# Patient Record
Sex: MALE | Race: WHITE | NOT HISPANIC OR LATINO | Employment: OTHER | URBAN - METROPOLITAN AREA
[De-identification: names, ages, dates, MRNs, and addresses within clinical notes are randomized per-mention and may not be internally consistent; named-entity substitution may affect disease eponyms.]

---

## 2017-01-05 ENCOUNTER — TELEPHONE (OUTPATIENT)
Dept: HEPATOLOGY | Facility: CLINIC | Age: 62
End: 2017-01-05

## 2017-01-05 NOTE — TELEPHONE ENCOUNTER
----- Message from Elyssa Hyatt MD sent at 1/3/2017  9:44 PM CST -----  The Labs are stable - please let patient know.

## 2017-01-25 ENCOUNTER — CLINICAL SUPPORT (OUTPATIENT)
Dept: SMOKING CESSATION | Facility: CLINIC | Age: 62
End: 2017-01-25
Payer: COMMERCIAL

## 2017-01-25 DIAGNOSIS — F17.210 MODERATE SMOKER (20 OR LESS PER DAY): Primary | ICD-10-CM

## 2017-01-25 PROCEDURE — 99404 PREV MED CNSL INDIV APPRX 60: CPT | Mod: S$GLB,,,

## 2017-01-25 RX ORDER — VARENICLINE TARTRATE 0.5 (11)-1
KIT ORAL
Qty: 1 PACKAGE | Refills: 0 | Status: SHIPPED | OUTPATIENT
Start: 2017-01-25 | End: 2017-02-26

## 2017-02-16 ENCOUNTER — CLINICAL SUPPORT (OUTPATIENT)
Dept: SMOKING CESSATION | Facility: CLINIC | Age: 62
End: 2017-02-16
Payer: COMMERCIAL

## 2017-02-16 DIAGNOSIS — F17.210 MODERATE SMOKER (20 OR LESS PER DAY): Primary | ICD-10-CM

## 2017-02-16 PROCEDURE — 90853 GROUP PSYCHOTHERAPY: CPT | Mod: S$GLB,,,

## 2017-02-16 NOTE — PROGRESS NOTES
Site: [facility]  Date:  2/16/2017  Clinical Status of Patient: Outpatient   Length of Service and Code: 90 minutes - 05604   Number in Attendance: 7  Group Activities/Focus of Group:  orientation, client introductions, completion of TCRS (Tobacco Cessation Rating Scale) learned addiction model, cues/triggers, personal reasons for quitting, medications, goals, quit date    Target symptoms:  withdrawal and medication side effects             The following were rated moderate (3) to severe (4) on TCRS:       Moderate 3: None      Severe 4:   None  Patient's Response to Intervention: Patient remains on tobacco cessation prescribed medication regimen of Chantix 1mg  twice a day without any negative side effects at this time. Patient is down to 7-8 cigarettes per day from 20 a day. Patient will return in 1 week for tobacco cessation group.  Progress Toward Goals and Other Mental Status Changes: The patient denies any abnormal behavioral or mental changes at this time.       Diagnosis: Z72.0  Plan: The patient will continue with group therapy sessions and medication regimen prescribed with management by physician or Cessation Clinic Provider. Patient will inform Smoking Clinic Cessation Counselor of symptoms as rated high on TCRS.    Return to Clinic: 1 week

## 2017-02-16 NOTE — Clinical Note
Patient remains on tobacco cessation prescribed medication regimen of Chantix 1mg  twice a day without any negative side effects at this time. Patient is down to 7-8 cigarettes per day from 20 a day. Patient will return in 1 week for tobacco cessation group

## 2017-02-23 ENCOUNTER — CLINICAL SUPPORT (OUTPATIENT)
Dept: SMOKING CESSATION | Facility: CLINIC | Age: 62
End: 2017-02-23
Payer: COMMERCIAL

## 2017-02-23 DIAGNOSIS — F17.210 SMOKES 1/2 PACK/DAY OR LESS: Primary | ICD-10-CM

## 2017-02-23 PROCEDURE — 90853 GROUP PSYCHOTHERAPY: CPT | Mod: S$GLB,,,

## 2017-02-23 NOTE — PROGRESS NOTES
Smoking Cessation Group Session #2    Site: [facility]  Date:  2/23/2017  Clinical Status of Patient: Outpatient   Length of Service and Code: 90 minutes - 88279   Number in Attendance: 6  Group Activities/Focus of Group: completion of TCRS (Tobacco Cessation Rating Scale) reviewed strategies, cues, and triggers. Introduced the negative impact of tobacco on health, the health advantages of discontinuing the use of tobacco, time line improved health changes after a quit, withdrawal issues to expect from nicotine and habit, and ways to achieve the goal of a quit.    Target symptoms:  withdrawal and medication side effects             The following were rated moderate (3) to severe (4) on TCRS:       Moderate 3: none     Severe 4:   none  Patient's Response to Intervention: 24ppm; 10 cig/day; The patient remains on the prescribed tobacco cessation medication regimen of 1 mg Chantix BID without any negative side effects at this time.     Progress Toward Goals and Other Mental Status Changes: The patient denies any abnormal behavioral or mental changes at this time.     Diagnosis: Z72.0  Plan: The patient will continue with group therapy sessions and medication monitoring by CTTS. Prescribed medication management will be by physician.   Return to Clinic: 1 week    Quit Date:    Planned Quit Date:

## 2017-02-23 NOTE — Clinical Note
24ppm; 10 cig/day; The patient remains on the prescribed tobacco cessation medication regimen of 1 mg Chantix BID without any negative side effects at this time.

## 2017-03-09 ENCOUNTER — CLINICAL SUPPORT (OUTPATIENT)
Dept: SMOKING CESSATION | Facility: CLINIC | Age: 62
End: 2017-03-09
Payer: COMMERCIAL

## 2017-03-09 DIAGNOSIS — F17.210 LIGHT CIGARETTE SMOKER (1-9 CIGS/DAY): Primary | ICD-10-CM

## 2017-03-09 PROCEDURE — 90853 GROUP PSYCHOTHERAPY: CPT | Mod: S$GLB,,,

## 2017-03-09 RX ORDER — IBUPROFEN 200 MG
1 TABLET ORAL DAILY
Qty: 14 PATCH | Refills: 0 | Status: SHIPPED | OUTPATIENT
Start: 2017-03-09 | End: 2017-06-20

## 2017-03-09 NOTE — PROGRESS NOTES
Smoking Cessation Group Session #4    Site: [facility]  Date:  3/9/2017  Clinical Status of Patient: Outpatient   Length of Service and Code: 90 minutes - 57418   Number in Attendance: 5  Group Activities/Focus of Group: completion of TCRS (Tobacco Cessation Rating Scale) reviewed strategies, habitual behavior, stress, and high risk situations. Introduced stress with addition interventions, SOLVE, relaxation with interventions, nutrition, exercise, weight gain, and the importance of rewarding oneself for accomplishments toward becoming tobacco free. Open discussion of all items with interventions.     Target symptoms:  withdrawal and medication side effects             The following were rated moderate (3) to severe (4) on TCRS:       Moderate 3: none     Severe 4:   none  Patient's Response to Intervention: 10ppm; 4-5 cig/day; pt reports stopped taking Chantix on 3/6/17 due to skin rash and diarrhea, pt will be 14mg nicotine patch daily as directed; desire to quit = 10, confidence = 10    Progress Toward Goals and Other Mental Status Changes: The patient denies any abnormal behavioral or mental changes at this time.     Diagnosis: Z72.0  Plan: The patient will continue with group therapy sessions and medication monitoring by CTTS. Prescribed medication management will be by physician.   Return to Clinic: 1 week    Quit Date:    Planned Quit Date:

## 2017-03-09 NOTE — Clinical Note
10ppm; 4-5 cig/day; pt reports stopped taking Chantix on 3/6/17 due to skin rash and diarrhea, pt will be 14mg nicotine patch daily as directed

## 2017-03-16 ENCOUNTER — CLINICAL SUPPORT (OUTPATIENT)
Dept: SMOKING CESSATION | Facility: CLINIC | Age: 62
End: 2017-03-16
Payer: COMMERCIAL

## 2017-03-16 DIAGNOSIS — F17.210 LIGHT CIGARETTE SMOKER (1-9 CIGS/DAY): Primary | ICD-10-CM

## 2017-03-16 PROCEDURE — 90853 GROUP PSYCHOTHERAPY: CPT | Mod: S$GLB,,,

## 2017-03-16 NOTE — PROGRESS NOTES
"Smoking Cessation Group Session #5    Site: [facility]  Date:  3/16/2017  Clinical Status of Patient: Outpatient   Length of Service and Code: 90 minutes - 90998   Number in Attendance: 4  Group Activities/Focus of Group: completion of TCRS (Tobacco Cessation Rating Scale) reviewed strategies, habitual behavior, high risks situations, understanding urges and cravings, stress and relaxation with open discussion and additional interventions, Introduced lapses, relapses, understanding them and analyzing the situation of a lapse, conflict issues that may be linked to a lapse.     Target symptoms:  withdrawal and medication side effects             The following were rated moderate (3) to severe (4) on TCRS:       Moderate 3: none     Severe 4:   none  Patient's Response to Intervention: 7ppm; 4-5 cig/day; Patient remains on prescribed tobacco cessation medication regimen of 14mg patch, without any negative side effects at this time, pt will attempt "dry run" on 3/19/17; desire to quit = 10, confidence = 10      Progress Toward Goals and Other Mental Status Changes: The patient denies any abnormal behavioral or mental changes at this time.     Diagnosis: Z72.0  Plan: The patient will continue with group therapy sessions and medication monitoring by CTTS. Prescribed medication management will be by physician.   Return to Clinic: 1 week    Quit Date:    Planned Quit Date: 3/19/17  "

## 2017-03-16 NOTE — Clinical Note
" 7ppm; 4-5 cig/day; Patient remains on prescribed tobacco cessation medication regimen of 14mg patch, without any negative side effects at this time, pt will attempt "dry run" on 3/19/17; desire to quit = 10, confidence = 10"

## 2017-03-23 ENCOUNTER — CLINICAL SUPPORT (OUTPATIENT)
Dept: SMOKING CESSATION | Facility: CLINIC | Age: 62
End: 2017-03-23
Payer: COMMERCIAL

## 2017-03-23 DIAGNOSIS — F17.210 LIGHT CIGARETTE SMOKER (1-9 CIGS/DAY): Primary | ICD-10-CM

## 2017-03-23 PROCEDURE — 90853 GROUP PSYCHOTHERAPY: CPT | Mod: S$GLB,,,

## 2017-03-23 NOTE — PROGRESS NOTES
Smoking Cessation Group Session #6    Site: [facility]  Date:  3/23/2017  Clinical Status of Patient: Outpatient   Length of Service and Code: 90 minutes - 92446   Number in Attendance: 5  Group Activities/Focus of Group:  completion of TCRS (Tobacco Cessation Rating Scale) reviewed strategies, cues, triggers, high risk situations, lapses, relapses, diet, exercise, stress, relaxation, sleep, habitual behavior, and life style changes.    Target symptoms:  withdrawal and medication side effects             The following were rated moderate (3) to severe (4) on TCRS:       Moderate 3: none     Severe 4:   none  Patient's Response to Intervention: 18ppm; 3-4 cig/day; pt will discontinue use of nicotine patch and resume Chantix for cessation treatment (.5mg one time daily for 4 days, then .5mg BID for 3 days, then 1mg BID for remainder of treatment)  Progress Toward Goals and Other Mental Status Changes: The patient denies any abnormal behavioral or mental changes at this time.     Diagnosis: Z72.0  Plan: The patient will continue with group therapy sessions and medication monitoring by CTTS. Prescribed medication management will be by physician.   Return to Clinic: 2 weeks    Quit Date:    Planned Quit Date: 3/26/17

## 2017-03-23 NOTE — Clinical Note
18ppm; 3-4 cig/day; pt will discontinue use of nicotine patch and resume Chantix for cessation treatment (.5mg one time daily for 4 days, then .5mg BID for 3 days, then 1mg BID for remainder of treatment)

## 2017-04-06 ENCOUNTER — CLINICAL SUPPORT (OUTPATIENT)
Dept: SMOKING CESSATION | Facility: CLINIC | Age: 62
End: 2017-04-06
Payer: COMMERCIAL

## 2017-04-06 DIAGNOSIS — F17.210 LIGHT CIGARETTE SMOKER (1-9 CIGS/DAY): Primary | ICD-10-CM

## 2017-04-06 PROCEDURE — 99401 PREV MED CNSL INDIV APPRX 15: CPT | Mod: S$GLB,,,

## 2017-04-06 RX ORDER — VARENICLINE TARTRATE 1 MG/1
1 TABLET, FILM COATED ORAL 2 TIMES DAILY
Qty: 60 TABLET | Refills: 0 | Status: SHIPPED | OUTPATIENT
Start: 2017-04-06 | End: 2017-05-07

## 2017-04-06 NOTE — PROGRESS NOTES
Individual Follow-Up Form    4/6/2017    Quit Date:     Clinical Status of Patient: Outpatient    Length of Service: 15 minutes    Continuing Medication: yes  Chantix    Other Medications:      Target Symptoms: Withdrawal and medication side effects. The following were  rated moderate (3) to severe (4) on TCRS:  · Moderate (3): none  · Severe (4): none    Comments: 19ppm; 3-4 cig/day; pt went on vacation for 5 days and only smoked 3 cigarettes during whole trip, has been back to 3-4 cig/day since returned home; The patient remains on the prescribed tobacco cessation medication regimen of 1 mg Chantix BID without any negative side effects at this time; The patient denies any abnormal behavioral or mental changes at this time; desire to quit = 10, confidence = 10        Diagnosis: F17.210    Next Visit: 2 weeks

## 2017-04-06 NOTE — Clinical Note
19ppm; 3-4 cig/day; pt went on vacation for 5 days and only smoked 3 cigarettes during whole trip, has been back to 3-4 cig/day since returned home; The patient remains on the prescribed tobacco cessation medication regimen of 1 mg Chantix BID without any negative side effects at this time; The patient denies any abnormal behavioral or mental changes at this time; desire to quit = 10, confidence = 10

## 2017-04-11 ENCOUNTER — HOSPITAL ENCOUNTER (OUTPATIENT)
Dept: RADIOLOGY | Facility: HOSPITAL | Age: 62
Discharge: HOME | End: 2017-04-11
Attending: INTERNAL MEDICINE
Payer: MEDICAID

## 2017-04-11 DIAGNOSIS — R18.8 OTHER ASCITES: ICD-10-CM

## 2017-04-11 PROCEDURE — 76705 ECHO EXAM OF ABDOMEN: CPT | Mod: 26,,, | Performed by: RADIOLOGY

## 2017-04-11 PROCEDURE — 76705 ECHO EXAM OF ABDOMEN: CPT | Mod: TC

## 2017-04-27 ENCOUNTER — CLINICAL SUPPORT (OUTPATIENT)
Dept: SMOKING CESSATION | Facility: CLINIC | Age: 62
End: 2017-04-27
Payer: COMMERCIAL

## 2017-04-27 DIAGNOSIS — F17.210 LIGHT CIGARETTE SMOKER (1-9 CIGS/DAY): Primary | ICD-10-CM

## 2017-04-27 PROCEDURE — 99407 BEHAV CHNG SMOKING > 10 MIN: CPT | Mod: S$GLB,,,

## 2017-04-27 RX ORDER — ASPIRIN/CALCIUM CARB/MAGNESIUM 325 MG
TABLET ORAL
Qty: 108 LOZENGE | Refills: 0 | Status: SHIPPED | OUTPATIENT
Start: 2017-04-27 | End: 2018-01-22

## 2017-04-27 NOTE — Clinical Note
7ppm; 3-4 cig/day; The patient remains on the prescribed tobacco cessation medication regimen of 1 mg Chantix BID, along with 4mg nicotine lozenges as needed for cravings,  without any negative side effects at this time; The patient denies any abnormal behavioral or mental changes at this time; desire to quit = 10, confidence = 10

## 2017-04-27 NOTE — PROGRESS NOTES
Individual Follow-Up Form    4/27/2017    Quit Date:     Clinical Status of Patient: Outpatient    Length of Service: 15 minutes    Continuing Medication: yes  Chantix or Nicotine Lozenges    Other Medications:      Target Symptoms: Withdrawal and medication side effects. The following were  rated moderate (3) to severe (4) on TCRS:  · Moderate (3): none  · Severe (4): none    Comments: 7ppm; 3-4 cig/day; The patient remains on the prescribed tobacco cessation medication regimen of 1 mg Chantix BID, along with 4mg nicotine lozenges as needed for cravings,  without any negative side effects at this time; The patient denies any abnormal behavioral or mental changes at this time; desire to quit = 10, confidence = 10           Diagnosis: F17.210    Next Visit: 2 weeks

## 2017-05-11 ENCOUNTER — CLINICAL SUPPORT (OUTPATIENT)
Dept: SMOKING CESSATION | Facility: CLINIC | Age: 62
End: 2017-05-11
Payer: COMMERCIAL

## 2017-05-11 DIAGNOSIS — F17.210 LIGHT CIGARETTE SMOKER (1-9 CIGS/DAY): Primary | ICD-10-CM

## 2017-05-11 PROCEDURE — 99407 BEHAV CHNG SMOKING > 10 MIN: CPT | Mod: S$GLB,,,

## 2017-05-11 NOTE — Clinical Note
2ppm; 1-2 cig/day; The patient remains on the prescribed tobacco cessation medication regimen of 1 mg Chantix BID, along with 4mg nicotine lozenge as needed,without any negative side effects at this time.; desire to quit = 10, confidence = 10

## 2017-06-06 ENCOUNTER — CLINICAL SUPPORT (OUTPATIENT)
Dept: SMOKING CESSATION | Facility: CLINIC | Age: 62
End: 2017-06-06
Payer: COMMERCIAL

## 2017-06-06 DIAGNOSIS — F17.200 NICOTINE DEPENDENCE: Primary | ICD-10-CM

## 2017-06-06 PROCEDURE — 99407 BEHAV CHNG SMOKING > 10 MIN: CPT | Mod: S$GLB,,,

## 2017-07-28 RX ORDER — PANTOPRAZOLE SODIUM 40 MG/1
TABLET, DELAYED RELEASE ORAL
Qty: 30 TABLET | Refills: 11 | Status: SHIPPED | OUTPATIENT
Start: 2017-07-28 | End: 2017-08-14 | Stop reason: SDUPTHER

## 2017-10-13 DIAGNOSIS — R18.8 OTHER ASCITES: ICD-10-CM

## 2017-10-15 RX ORDER — SPIRONOLACTONE 50 MG/1
TABLET, FILM COATED ORAL
Qty: 90 TABLET | Refills: 11 | Status: SHIPPED | OUTPATIENT
Start: 2017-10-15 | End: 2018-06-04 | Stop reason: ALTCHOICE

## 2018-01-10 ENCOUNTER — CLINICAL SUPPORT (OUTPATIENT)
Dept: SMOKING CESSATION | Facility: CLINIC | Age: 63
End: 2018-01-10
Payer: COMMERCIAL

## 2018-01-10 DIAGNOSIS — F17.200 NICOTINE DEPENDENCE: Primary | ICD-10-CM

## 2018-01-10 PROCEDURE — 99407 BEHAV CHNG SMOKING > 10 MIN: CPT | Mod: S$GLB,,,

## 2018-01-22 ENCOUNTER — CLINICAL SUPPORT (OUTPATIENT)
Dept: SMOKING CESSATION | Facility: CLINIC | Age: 63
End: 2018-01-22
Payer: COMMERCIAL

## 2018-01-22 DIAGNOSIS — F17.210 LIGHT CIGARETTE SMOKER (1-9 CIGS/DAY): Primary | ICD-10-CM

## 2018-01-22 PROCEDURE — 99404 PREV MED CNSL INDIV APPRX 60: CPT | Mod: S$GLB,,,

## 2018-01-22 RX ORDER — BUPROPION HYDROCHLORIDE 150 MG/1
TABLET, EXTENDED RELEASE ORAL
Qty: 60 TABLET | Refills: 0 | Status: SHIPPED | OUTPATIENT
Start: 2018-01-22 | End: 2018-02-20 | Stop reason: SDUPTHER

## 2018-01-23 ENCOUNTER — CLINICAL SUPPORT (OUTPATIENT)
Dept: SMOKING CESSATION | Facility: CLINIC | Age: 63
End: 2018-01-23
Payer: COMMERCIAL

## 2018-01-23 DIAGNOSIS — F17.210 SMOKES 1/2 PACK/DAY OR LESS: Primary | ICD-10-CM

## 2018-01-23 PROCEDURE — 90853 GROUP PSYCHOTHERAPY: CPT | Mod: S$GLB,,,

## 2018-01-23 NOTE — PROGRESS NOTES
Smoking Cessation Group Session #2    Site: [facility]  Date:  1/23/2018  Clinical Status of Patient: Outpatient   Length of Service and Code: 90 minutes - 65251   Number in Attendance: 7  Group Activities/Focus of Group:  completion of TCRS (Tobacco Cessation Rating Scale) reviewed strategies, cues, and triggers. Introduced the negative impact of tobacco on health, the health advantages of discontinuing the use of tobacco, time line improved health changes after a quit, withdrawal issues to expect from nicotine and habit, and ways to achieve the goal of a quit.    Target symptoms:  withdrawal and medication side effects             The following were rated moderate (3) to severe (4) on TCRS:       Moderate 3: desire/crave tobacco; reviewed with patient     Severe 4:   none  Patient's Response to Intervention: 9ppm; 10 cig/day; The patient remains on the prescribed tobacco cessation medication regimen of 150 mg bupropion BID without any negative side effects at this time; desire to quit = 10, confidence = 10         Progress Toward Goals and Other Mental Status Changes: The patient denies any abnormal behavioral or mental changes at this time.     Diagnosis: Z72.0  Plan: The patient will continue with group therapy sessions and medication monitoring by CTTS. Prescribed medication management will be by physician.   Return to Clinic: 1 week    Quit Date:    Planned Quit Date:

## 2018-01-23 NOTE — Clinical Note
9ppm; 10 cig/day; The patient remains on the prescribed tobacco cessation medication regimen of 150 mg bupropion BID without any negative side effects at this time; desire to quit = 10, confidence = 10

## 2018-01-30 ENCOUNTER — TELEPHONE (OUTPATIENT)
Dept: SMOKING CESSATION | Facility: CLINIC | Age: 63
End: 2018-01-30

## 2018-02-06 ENCOUNTER — CLINICAL SUPPORT (OUTPATIENT)
Dept: SMOKING CESSATION | Facility: CLINIC | Age: 63
End: 2018-02-06
Payer: COMMERCIAL

## 2018-02-06 DIAGNOSIS — F17.210 LIGHT CIGARETTE SMOKER (1-9 CIGS/DAY): Primary | ICD-10-CM

## 2018-02-06 PROCEDURE — 90853 GROUP PSYCHOTHERAPY: CPT | Mod: S$GLB,,,

## 2018-02-06 NOTE — PROGRESS NOTES
Smoking Cessation Group Session #4    Site: [facility]  Date:  2/6/2018  Clinical Status of Patient: Outpatient   Length of Service and Code: 90 minutes - 35805   Number in Attendance: 4  Group Activities/Focus of Group:  completion of TCRS (Tobacco Cessation Rating Scale) reviewed strategies, habitual behavior, stress, and high risk situations. Introduced stress with addition interventions, SOLVE, relaxation with interventions, nutrition, exercise, weight gain, and the importance of rewarding oneself for accomplishments toward becoming tobacco free. Open discussion of all items with interventions.     Target symptoms:  withdrawal and medication side effects             The following were rated moderate (3) to severe (4) on TCRS:       Moderate 3: increased appetite/hunger; reviewed with patient     Severe 4:   none  Patient's Response to Intervention: 7ppm; 2-3 cig/day; The patient remains on the prescribed tobacco cessation medication regimen of 150 mg bupropion BID without any negative side effects at this time; desire to quit = 10, confidence = 10      Progress Toward Goals and Other Mental Status Changes: The patient denies any abnormal behavioral or mental changes at this time.     Diagnosis: Z72.0  Plan: The patient will continue with group therapy sessions and medication monitoring by CTTS. Prescribed medication management will be by physician.   Return to Clinic: 2 weeks    Quit Date:    Planned Quit Date:

## 2018-02-06 NOTE — Clinical Note
7ppm; 2-3 cig/day; The patient remains on the prescribed tobacco cessation medication regimen of 150 mg bupropion BID without any negative side effects at this time; desire to quit = 10, confidence = 10

## 2018-02-20 ENCOUNTER — CLINICAL SUPPORT (OUTPATIENT)
Dept: SMOKING CESSATION | Facility: CLINIC | Age: 63
End: 2018-02-20
Payer: COMMERCIAL

## 2018-02-20 DIAGNOSIS — F17.210 LIGHT CIGARETTE SMOKER (1-9 CIGS/DAY): Primary | ICD-10-CM

## 2018-02-20 PROCEDURE — 90853 GROUP PSYCHOTHERAPY: CPT | Mod: S$GLB,,,

## 2018-02-20 RX ORDER — BUPROPION HYDROCHLORIDE 150 MG/1
TABLET, EXTENDED RELEASE ORAL
Qty: 60 TABLET | Refills: 0 | Status: SHIPPED | OUTPATIENT
Start: 2018-02-20 | End: 2018-03-15 | Stop reason: SDUPTHER

## 2018-02-20 NOTE — Clinical Note
12ppm; 4 cig/day; The patient remains on the prescribed tobacco cessation medication regimen of 150 mg bupropion BID without any negative side effects at this time; pt set goal of dry run on 2/21; desire to quit = 10, confidence = 10

## 2018-02-20 NOTE — PROGRESS NOTES
Smoking Cessation Group Session #5    Site: [facility]  Date:  2/20/2018  Clinical Status of Patient: Outpatient   Length of Service and Code: 90 minutes - 81733   Number in Attendance: 5  Group Activities/Focus of Group: completion of TCRS (Tobacco Cessation Rating Scale) reviewed strategies, habitual behavior, high risks situations, understanding urges and cravings, stress and relaxation with open discussion and additional interventions, Introduced lapses, relapses, understanding them and analyzing the situation of a lapse, conflict issues that may be linked to a lapse.     Target symptoms:  withdrawal and medication side effects             The following were rated moderate (3) to severe (4) on TCRS:       Moderate 3: restless; reviewed with patient     Severe 4:   Increased appetite/hunger; reviewed with patient  Patient's Response to Intervention: 12ppm; 4 cig/day; The patient remains on the prescribed tobacco cessation medication regimen of 150 mg bupropion BID without any negative side effects at this time; pt set goal of dry run on 2/21; desire to quit = 10, confidence = 10         Progress Toward Goals and Other Mental Status Changes: The patient denies any abnormal behavioral or mental changes at this time.     Diagnosis: Z72.0  Plan: The patient will continue with group therapy sessions and medication monitoring by CTTS. Prescribed medication management will be by physician.   Return to Clinic: 1 week    Quit Date:    Planned Quit Date:

## 2018-02-27 ENCOUNTER — CLINICAL SUPPORT (OUTPATIENT)
Dept: SMOKING CESSATION | Facility: CLINIC | Age: 63
End: 2018-02-27
Payer: COMMERCIAL

## 2018-02-27 DIAGNOSIS — F17.210 LIGHT CIGARETTE SMOKER (1-9 CIGS/DAY): Primary | ICD-10-CM

## 2018-02-27 PROCEDURE — 90853 GROUP PSYCHOTHERAPY: CPT | Mod: S$GLB,,,

## 2018-02-27 NOTE — PROGRESS NOTES
Smoking Cessation Group Session #6    Site: [facility]  Date:  2/27/2018  Clinical Status of Patient: Outpatient   Length of Service and Code: 90 minutes - 24705   Number in Attendance: 5  Group Activities/Focus of Group:  completion of TCRS (Tobacco Cessation Rating Scale) reviewed strategies, cues, triggers, high risk situations, lapses, relapses, diet, exercise, stress, relaxation, sleep, habitual behavior, and life style changes.    Target symptoms:  withdrawal and medication side effects             The following were rated moderate (3) to severe (4) on TCRS:       Moderate 3: none     Severe 4:   none  Patient's Response to Intervention: 7ppm; 3-4 cig/day; The patient remains on the prescribed tobacco cessation medication regimen of 150 mg bupropion BID without any negative side effects at this time; desire to quit = 10, confidence = 10        Progress Toward Goals and Other Mental Status Changes: The patient denies any abnormal behavioral or mental changes at this time.     Diagnosis: f17.210    Plan: The patient will continue with group therapy sessions and medication monitoring by CTTS. Prescribed medication management will be by physician.   Return to Clinic: 2 weeks    Quit Date:    Planned Quit Date:

## 2018-02-27 NOTE — Clinical Note
7ppm; 3-4 cig/day; The patient remains on the prescribed tobacco cessation medication regimen of 150 mg bupropion BID without any negative side effects at this time; desire to quit = 10, confidence = 10

## 2018-03-15 ENCOUNTER — CLINICAL SUPPORT (OUTPATIENT)
Dept: SMOKING CESSATION | Facility: CLINIC | Age: 63
End: 2018-03-15
Payer: COMMERCIAL

## 2018-03-15 DIAGNOSIS — F17.210 LIGHT CIGARETTE SMOKER (1-9 CIGS/DAY): Primary | ICD-10-CM

## 2018-03-15 PROCEDURE — 99402 PREV MED CNSL INDIV APPRX 30: CPT | Mod: S$GLB,,,

## 2018-03-15 RX ORDER — BUPROPION HYDROCHLORIDE 150 MG/1
150 TABLET, EXTENDED RELEASE ORAL 2 TIMES DAILY
Qty: 60 TABLET | Refills: 0 | Status: SHIPPED | OUTPATIENT
Start: 2018-03-15 | End: 2018-04-16 | Stop reason: SDUPTHER

## 2018-03-15 NOTE — PROGRESS NOTES
Individual Follow-Up Form    3/15/2018    Quit Date:     Clinical Status of Patient: Outpatient    Length of Service: 30 minutes    Continuing Medication: no    Other Medications:      Target Symptoms: Withdrawal and medication side effects. The following were  rated moderate (3) to severe (4) on TCRS:  · Moderate (3): none  · Severe (4): none    Comments: 4ppm; 3-4 cig/day; pt states he discontinued use of all medications for no particular reason, just to quit taking them; pt encouraged to restart medication or to speak with PCP regarding his decision; pt reported to clinic smelling of alcohol use    Diagnosis: F17.210    Next Visit: 2 weeks

## 2018-03-15 NOTE — Clinical Note
4ppm; 3-4 cig/day; pt states he discontinued use of all medications for no particular reason, just to quit taking them; pt encouraged to restart medication or to speak with PCP regarding his decision; pt reported to clinic smelling of alcohol use

## 2018-04-16 ENCOUNTER — CLINICAL SUPPORT (OUTPATIENT)
Dept: SMOKING CESSATION | Facility: CLINIC | Age: 63
End: 2018-04-16
Payer: COMMERCIAL

## 2018-04-16 DIAGNOSIS — F17.210 LIGHT CIGARETTE SMOKER (1-9 CIGS/DAY): Primary | ICD-10-CM

## 2018-04-16 PROCEDURE — 99402 PREV MED CNSL INDIV APPRX 30: CPT | Mod: S$GLB,,,

## 2018-04-16 RX ORDER — BUPROPION HYDROCHLORIDE 150 MG/1
150 TABLET, EXTENDED RELEASE ORAL 2 TIMES DAILY
Qty: 60 TABLET | Refills: 0 | Status: SHIPPED | OUTPATIENT
Start: 2018-04-16 | End: 2018-06-04 | Stop reason: SDUPTHER

## 2018-04-16 NOTE — PROGRESS NOTES
Individual Follow-Up Form    4/16/2018    Quit Date:     Clinical Status of Patient: Outpatient    Length of Service: 30 minutes    Continuing Medication: yes  Wellbutrin    Other Medications:      Target Symptoms: Withdrawal and medication side effects. The following were  rated moderate (3) to severe (4) on TCRS:  · Moderate (3): none  · Severe (4): none    Comments: 15ppm; 10 cig/day; pt has not been compliant with taking medication and states restarted 150mg bupropion BID just this morning; pt will remain on 150mg bupropion BID as directed without negative side effects at this time; pt will follow up in clinic in 3 weeks to discuss medication use and forward progress in quit attempt     Diagnosis: F17.210    Next Visit: 5/7/18

## 2018-04-16 NOTE — Clinical Note
15ppm; 10 cig/day; pt has not been compliant with taking medication and states restarted 150mg bupropion BID just this morning; pt will remain on 150mg bupropion BID as directed without negative side effects at this time; pt will follow up in clinic in 3 weeks to discuss medication use and forward progress in quit attempt

## 2018-05-03 ENCOUNTER — CLINICAL SUPPORT (OUTPATIENT)
Dept: SMOKING CESSATION | Facility: CLINIC | Age: 63
End: 2018-05-03
Payer: COMMERCIAL

## 2018-05-03 DIAGNOSIS — F17.200 NICOTINE DEPENDENCE: Primary | ICD-10-CM

## 2018-05-03 PROCEDURE — 99407 BEHAV CHNG SMOKING > 10 MIN: CPT | Mod: S$GLB,,, | Performed by: INTERNAL MEDICINE

## 2018-05-03 NOTE — PROGRESS NOTES
Spoke with patient today in regard to smoking cessation progress, he states 1 pack of cigarettes last him a week now and everything is going well. Patient states he has no problems with medication at this time. Patient has appointment scheduled and will follow up with CTTS. Will complete smart form for 3 and 6 month follow up.

## 2018-05-07 ENCOUNTER — CLINICAL SUPPORT (OUTPATIENT)
Dept: SMOKING CESSATION | Facility: CLINIC | Age: 63
End: 2018-05-07
Payer: COMMERCIAL

## 2018-05-07 DIAGNOSIS — F17.210 LIGHT CIGARETTE SMOKER (1-9 CIGS/DAY): Primary | ICD-10-CM

## 2018-05-07 PROCEDURE — 99402 PREV MED CNSL INDIV APPRX 30: CPT | Mod: S$GLB,,,

## 2018-05-07 NOTE — Clinical Note
2-3 cig/day; pt reports being more compliant with taking medications, and has properly taken 150mg bupropion BID for the last week; pt is noticing progress in quit attempt and is looking forward to not buying anymore packs

## 2018-05-07 NOTE — PROGRESS NOTES
Individual Follow-Up Form    5/7/2018    Quit Date:     Clinical Status of Patient: Outpatient    Length of Service: 30 minutes    Continuing Medication: yes  Wellbutrin    Other Medications:      Target Symptoms: Withdrawal and medication side effects. The following were  rated moderate (3) to severe (4) on TCRS:  · Moderate (3): none  · Severe (4): none    Comments: 2-3 cig/day; pt reports being more compliant with taking medications, and has properly taken 150mg bupropion BID for the last week; pt is noticing progress in quit attempt and is looking forward to not buying anymore packs    Diagnosis: F17.210    Next Visit: 2 weeks

## 2018-05-30 ENCOUNTER — TELEPHONE (OUTPATIENT)
Dept: SMOKING CESSATION | Facility: CLINIC | Age: 63
End: 2018-05-30

## 2018-06-04 PROBLEM — E78.2 MIXED HYPERLIPIDEMIA: Status: ACTIVE | Noted: 2018-06-04

## 2018-06-07 ENCOUNTER — CLINICAL SUPPORT (OUTPATIENT)
Dept: SMOKING CESSATION | Facility: CLINIC | Age: 63
End: 2018-06-07
Payer: COMMERCIAL

## 2018-06-07 DIAGNOSIS — F17.210 LIGHT CIGARETTE SMOKER (1-9 CIGS/DAY): Primary | ICD-10-CM

## 2018-06-07 PROCEDURE — 99402 PREV MED CNSL INDIV APPRX 30: CPT | Mod: S$GLB,,,

## 2018-06-07 RX ORDER — NICOTINE 7MG/24HR
1 PATCH, TRANSDERMAL 24 HOURS TRANSDERMAL DAILY
Qty: 14 PATCH | Refills: 0 | Status: SHIPPED | OUTPATIENT
Start: 2018-06-07 | End: 2018-06-21 | Stop reason: SDUPTHER

## 2018-06-07 NOTE — Clinical Note
3-10 cig/day; pt continues to properly use cessation medication and remains on the prescribed tobacco cessation medication regimen of 150 mg bupropion BID, adding 7mg nicotine patch daily as directed, without any negative side effects at this time; pt is focused on resuming forward progress in quit attempt

## 2018-06-07 NOTE — PROGRESS NOTES
Individual Follow-Up Form    6/7/2018    Quit Date:     Clinical Status of Patient: Outpatient    Length of Service: 30 minutes    Continuing Medication: yes  Wellbutrin or Patches    Other Medications:      Target Symptoms: Withdrawal and medication side effects. The following were  rated moderate (3) to severe (4) on TCRS:  · Moderate (3): none  · Severe (4): none    Comments: 3-10 cig/day; pt continues to properly use cessation medication and remains on the prescribed tobacco cessation medication regimen of 150 mg bupropion BID, adding 7mg nicotine patch daily as directed, without any negative side effects at this time; pt is focused on resuming forward progress in quit attempt        Diagnosis: F17.210    Next Visit: 2 weeks

## 2018-06-21 ENCOUNTER — CLINICAL SUPPORT (OUTPATIENT)
Dept: SMOKING CESSATION | Facility: CLINIC | Age: 63
End: 2018-06-21
Payer: COMMERCIAL

## 2018-06-21 DIAGNOSIS — F17.210 LIGHT CIGARETTE SMOKER (1-9 CIGS/DAY): Primary | ICD-10-CM

## 2018-06-21 PROCEDURE — 99402 PREV MED CNSL INDIV APPRX 30: CPT | Mod: S$GLB,,,

## 2018-06-21 RX ORDER — NICOTINE 7MG/24HR
1 PATCH, TRANSDERMAL 24 HOURS TRANSDERMAL DAILY
Qty: 14 PATCH | Refills: 0 | Status: SHIPPED | OUTPATIENT
Start: 2018-06-21 | End: 2019-03-18 | Stop reason: ALTCHOICE

## 2018-06-21 NOTE — Clinical Note
2ppm; 2-7 cig/day; The patient remains on the prescribed tobacco cessation medication regimen of 150 mg bupropion BID, along with 7mg nicotine patch daily as directed, without any negative side effects at this time; pt has set goal of 2-4 cig/day

## 2018-06-21 NOTE — PROGRESS NOTES
Individual Follow-Up Form    6/21/2018    Quit Date:     Clinical Status of Patient: Outpatient    Length of Service: 30 minutes    Continuing Medication: yes  Wellbutrin or Patches    Other Medications:      Target Symptoms: Withdrawal and medication side effects. The following were  rated moderate (3) to severe (4) on TCRS:  · Moderate (3): none  · Severe (4): none    Comments: 2ppm; 2-7 cig/day; The patient remains on the prescribed tobacco cessation medication regimen of 150 mg bupropion BID, along with 7mg nicotine patch daily as directed, without any negative side effects at this time; pt has set goal of 2-4 cig/day        Diagnosis: F17.210    Next Visit: 2 weeks

## 2018-07-20 ENCOUNTER — TELEPHONE (OUTPATIENT)
Dept: SMOKING CESSATION | Facility: CLINIC | Age: 63
End: 2018-07-20

## 2018-08-02 ENCOUNTER — CLINICAL SUPPORT (OUTPATIENT)
Dept: SMOKING CESSATION | Facility: CLINIC | Age: 63
End: 2018-08-02
Payer: COMMERCIAL

## 2018-08-02 DIAGNOSIS — F17.200 NICOTINE DEPENDENCE: Primary | ICD-10-CM

## 2018-08-02 PROCEDURE — 99407 BEHAV CHNG SMOKING > 10 MIN: CPT | Mod: S$GLB,,, | Performed by: INTERNAL MEDICINE

## 2018-08-02 NOTE — PROGRESS NOTES
Spoke with patient today in regard to smoking cessation progress for 6 month follow up, he states tobacco free for 3 weeks. Commended patient on the accomplishment. Informed patient of benefit period, follow up for 1 year, and contact information if any further help or support is needed. Will complete smart form for 6 month follow up on Quit attempt #2.

## 2019-01-24 ENCOUNTER — CLINICAL SUPPORT (OUTPATIENT)
Dept: SMOKING CESSATION | Facility: CLINIC | Age: 64
End: 2019-01-24
Payer: COMMERCIAL

## 2019-01-24 DIAGNOSIS — F17.200 NICOTINE DEPENDENCE: Primary | ICD-10-CM

## 2019-01-24 PROCEDURE — 99407 PR TOBACCO USE CESSATION INTENSIVE >10 MINUTES: ICD-10-PCS | Mod: S$GLB,,,

## 2019-01-24 PROCEDURE — 99407 BEHAV CHNG SMOKING > 10 MIN: CPT | Mod: S$GLB,,,

## 2019-01-24 NOTE — PROGRESS NOTES
Spoke with patient today in regard to smoking cessation progress for 12 month follow up, he is currently tobacco free. Congratulated patient on the quit episode. Informed patient of benefit period, and contact information if any further help or support is needed.  Completed / resolved smart form for 12 month follow up on Quit 2 .

## 2019-03-20 ENCOUNTER — DOCUMENTATION ONLY (OUTPATIENT)
Dept: TRANSPLANT | Facility: CLINIC | Age: 64
End: 2019-03-20

## 2019-03-20 NOTE — NURSING
Pt records reviewed.   Pt will be referred to Hepatology.    Initial referral received  from the workque.   Referring Provider/diagnosis      GRACIA CORADO (Internal)   PRAVIN AGGARWAL MD   Alcoholic cirrhosis of liver with ascites      Referral letter sent to patient.

## 2019-03-20 NOTE — LETTER
March 20, 2019    Dexter Robertson  [address]  [address]  [facility] [address]      Dear Dexter Robertson:    Your doctor has referred you to the [facility]. We are sending this letter to remind you to make an appointment with us to complete the referral process.     Please call us at [phone] to schedule an appointment. We look forward to seeing you soon.     If you received a call and have been scheduled, please disregard this letter.       Sincerely,        [facility]   [address] [place] [id number]  [phone]

## 2019-03-24 ENCOUNTER — TELEPHONE (OUTPATIENT)
Dept: HEPATOLOGY | Facility: CLINIC | Age: 64
End: 2019-03-24

## 2019-03-24 DIAGNOSIS — K74.60 HEPATIC CIRRHOSIS, UNSPECIFIED HEPATIC CIRRHOSIS TYPE, UNSPECIFIED WHETHER ASCITES PRESENT: Primary | ICD-10-CM

## 2019-03-25 ENCOUNTER — TELEPHONE (OUTPATIENT)
Dept: HEPATOLOGY | Facility: CLINIC | Age: 64
End: 2019-03-25

## 2019-03-25 NOTE — TELEPHONE ENCOUNTER
----- Message from Gracia Corado MD sent at 3/24/2019  9:06 AM CDT -----  Orders in  ----- Message -----  From: Dariana Haley MA  Sent: 3/22/2019   4:39 PM  To: Gracia Corado MD        ----- Message -----  From: Jeannette Howard RN  Sent: 3/22/2019   4:04 PM  To: Olena Bergeron Staff        ----- Message -----  From: Jacquie St Anders  Sent: 3/21/2019  12:03 PM  To: Jeannette Howard, RN    Hi, since it's still under 3 yrs, the f/u should go to her MA, so that Dr. Corado can add any necessary orders to the visit.  ----- Message -----  From: Jeannette Howard RN  Sent: 3/20/2019  10:47 AM  To: Hepatology Scheduling    Pt records reviewed.   Pt will be referred to Hepatology.    Initial referral received  from the workque.   Referring Provider/diagnosis      GRACIA CORADO (Internal)  PRAVIN AGGARWAL MD  Alcoholic cirrhosis of liver with ascites     Referral letter sent to patient.

## 2019-03-29 ENCOUNTER — HOSPITAL ENCOUNTER (OUTPATIENT)
Dept: RADIOLOGY | Facility: OTHER | Age: 64
Discharge: HOME | End: 2019-03-29
Attending: INTERNAL MEDICINE
Payer: MEDICARE

## 2019-03-29 DIAGNOSIS — K74.60 HEPATIC CIRRHOSIS, UNSPECIFIED HEPATIC CIRRHOSIS TYPE, UNSPECIFIED WHETHER ASCITES PRESENT: ICD-10-CM

## 2019-03-29 PROCEDURE — 76700 US EXAM ABDOM COMPLETE: CPT | Mod: 26,,, | Performed by: RADIOLOGY

## 2019-03-29 PROCEDURE — 76700 US EXAM ABDOM COMPLETE: CPT | Mod: TC

## 2019-03-29 PROCEDURE — 76700 US ABDOMEN COMPLETE: ICD-10-PCS | Mod: 26,,, | Performed by: RADIOLOGY

## 2019-04-09 ENCOUNTER — OFFICE VISIT (OUTPATIENT)
Dept: HEPATOLOGY | Facility: CLINIC | Age: 64
End: 2019-04-09
Payer: MEDICARE

## 2019-04-09 VITALS
OXYGEN SATURATION: 99 % | SYSTOLIC BLOOD PRESSURE: 90 MMHG | BODY MASS INDEX: 27.3 KG/M2 | HEART RATE: 98 BPM | RESPIRATION RATE: 17 BRPM | WEIGHT: 190.69 LBS | DIASTOLIC BLOOD PRESSURE: 60 MMHG | TEMPERATURE: 98 F | HEIGHT: 70 IN

## 2019-04-09 DIAGNOSIS — K40.90 INGUINAL HERNIA WITHOUT OBSTRUCTION OR GANGRENE, RECURRENCE NOT SPECIFIED, UNSPECIFIED LATERALITY: ICD-10-CM

## 2019-04-09 DIAGNOSIS — K70.31 ALCOHOLIC CIRRHOSIS OF LIVER WITH ASCITES: Primary | ICD-10-CM

## 2019-04-09 PROCEDURE — 99999 PR PBB SHADOW E&M-EST. PATIENT-LVL V: CPT | Mod: PBBFAC,,, | Performed by: INTERNAL MEDICINE

## 2019-04-09 PROCEDURE — 99214 PR OFFICE/OUTPT VISIT, EST, LEVL IV, 30-39 MIN: ICD-10-PCS | Mod: S$GLB,,, | Performed by: INTERNAL MEDICINE

## 2019-04-09 PROCEDURE — 99999 PR PBB SHADOW E&M-EST. PATIENT-LVL V: ICD-10-PCS | Mod: PBBFAC,,, | Performed by: INTERNAL MEDICINE

## 2019-04-09 PROCEDURE — 3008F PR BODY MASS INDEX (BMI) DOCUMENTED: ICD-10-PCS | Mod: CPTII,S$GLB,, | Performed by: INTERNAL MEDICINE

## 2019-04-09 PROCEDURE — 3008F BODY MASS INDEX DOCD: CPT | Mod: CPTII,S$GLB,, | Performed by: INTERNAL MEDICINE

## 2019-04-09 PROCEDURE — 99214 OFFICE O/P EST MOD 30 MIN: CPT | Mod: S$GLB,,, | Performed by: INTERNAL MEDICINE

## 2019-04-09 NOTE — PROGRESS NOTES
HEPATOLOGY FOLLOW UP    Dexter Robertson is here for follow up of Cirrhosis    HPI  Since Dexter Robertson's last visit he continues to have issues with a hernia that has recurred. His ascites has resolved on diuretics. He states he isnot drinking any alcohol. However, his peth test is positive (77). He has no uncontrolled HE.    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 TIPS was not done due to a low gradient. Sx to repair the fistula has therefore been deferred.    Outpatient Encounter Medications as of 4/9/2019   Medication Sig Dispense Refill    buPROPion (WELLBUTRIN SR) 150 MG TBSR 12 hr tablet Take 1 tablet (150 mg total) by mouth 2 (two) times daily. 180 tablet 3    citalopram (CELEXA) 20 MG tablet Take 1 tablet (20 mg total) by mouth once daily. 90 tablet 3    furosemide (LASIX) 40 MG tablet Take 1.5 tablets (60 mg total) by mouth once daily. 45 tablet 2    lovastatin (MEVACOR) 20 MG tablet Take 1 tablet (20 mg total) by mouth every evening. 90 tablet 3    nystatin (MYCOSTATIN) cream Apply topically 3 (three) times daily. 60 g 0    pantoprazole (PROTONIX) 40 MG tablet Take 1 tablet (40 mg total) by mouth once daily. 90 tablet 3    spironolactone (ALDACTONE) 100 MG tablet Take 1.5 tablets (150 mg total) by mouth once daily. 45 tablet 2    omega-3 fatty acids-vitamin E (FISH OIL) 1,000 mg Cap Take 1 capsule by mouth 2 (two) times daily.  0     No facility-administered encounter medications on file as of 4/9/2019.      Review of patient's allergies indicates:   Allergen Reactions    Ativan [lorazepam] Other (See Comments)     confusion    Iodine and iodide containing products Edema    Pcn [penicillins] Edema    Bee sting [allergen ext-venom-honey bee]      Past Medical History:   Diagnosis Date    Bleeding ulcer     Depression     Diverticulitis     Fistula     High cholesterol     Smokes cigarettes        Review of Systems   Constitutional: Negative.    HENT: Negative.    Eyes:  Negative.    Respiratory: Negative.    Cardiovascular: Negative.    Gastrointestinal: Negative.    Genitourinary: Negative.    Musculoskeletal: Negative.    Skin: Negative.    Neurological: Negative.    Psychiatric/Behavioral: Negative.      Vitals:    04/09/19 1350   BP: 90/60   Pulse: 98   Resp: 17   Temp: 97.9 °F (36.6 °C)       Physical Exam   Constitutional: He is oriented to person, place, and time. He appears well-developed and well-nourished.   HENT:   Head: Normocephalic and atraumatic.   Eyes: Pupils are equal, round, and reactive to light. Conjunctivae and EOM are normal. No scleral icterus.   Neck: Normal range of motion. Neck supple. No thyromegaly present.   Cardiovascular: Normal rate, regular rhythm and normal heart sounds.   Pulmonary/Chest: Effort normal and breath sounds normal. He has no rales.   Abdominal: Soft. Bowel sounds are normal. He exhibits no distension and no mass. There is no tenderness.   Musculoskeletal: Normal range of motion. He exhibits no edema.   Neurological: He is alert and oriented to person, place, and time.   Skin: Skin is warm and dry. No rash noted.   Psychiatric: He has a normal mood and affect.   Vitals reviewed.      Lab Results   Component Value Date    GLU 96 01/21/2019    BUN 23 01/21/2019    CREATININE 1.2 01/21/2019    CALCIUM 9.4 01/21/2019     01/21/2019    K 4.3 01/21/2019     01/21/2019    PROT 7.8 06/04/2018    CO2 26 01/21/2019    ANIONGAP 10 01/21/2019    WBC 8.40 01/03/2017    RBC 5.65 01/03/2017    HGB 16.7 01/03/2017    HCT 51.5 01/03/2017    MCV 91 01/03/2017    MCH 29.5 01/03/2017    MCHC 32.3 01/03/2017     Lab Results   Component Value Date    RDW 13.9 01/03/2017     (H) 01/03/2017    MPV 8.4 (L) 01/03/2017    GRAN 5.3 01/03/2017    GRAN 63.1 01/03/2017    LYMPH 1.6 01/03/2017    LYMPH 18.8 01/03/2017    MONO 0.9 01/03/2017    MONO 10.6 01/03/2017    EOSINOPHIL 6.9 01/03/2017    BASOPHIL 0.6 01/03/2017    EOS 0.6 (H) 01/03/2017     BASO 0.10 01/03/2017    CHOL 254 (H) 06/04/2018    TRIG 372 (H) 06/04/2018    HDL 45 06/04/2018    CHOLHDL 17.7 (L) 06/04/2018    TOTALCHOLEST 5.6 (H) 06/04/2018    ALBUMIN 3.6 06/04/2018    AST 29 06/04/2018    ALT 27 06/04/2018    ALKPHOS 191 (H) 06/04/2018    MG 1.4 (L) 06/20/2016    LABPROT 11.0 06/04/2018    INR 1.0 06/04/2018    PSA 1.2 10/18/2016     MELD-Na score: 10 at 4/9/2019  2:47 PM  MELD score: 10 at 4/9/2019  2:47 PM  Calculated from:  Serum Creatinine: 1.37 mg/dL at 4/9/2019  2:47 PM  Serum Sodium: 139 mmol/L (Rounded to 137 mmol/L) at 4/9/2019  2:47 PM  Total Bilirubin: 0.4 mg/dL (Rounded to 1 mg/dL) at 4/9/2019  2:47 PM  INR(ratio): 1.0 at 4/9/2019  2:47 PM  Age: 63 years    Assessment and Plan:    Dexter Robertson is a 63 y.o. male with a colovesicula fistula and decompensated alcohol-induced Cirrhosis  Current recommendations:   1. Decompensated alcohol-induced cirrhosis: low (meld 10). Medically early for liver tx eval. Monitor meld labs every 12 weeks and screen for hcc every 6 months. Note recurrent alcohol use- will not be a liver transplant candidate sheould he need a new liver, due to ongoing drinking.  2. Ascites, improved: decrease lasix to 40 mg daily from 60 mg daily and aldactone should be lowered to 100 mg from 150 mg.  3. Hernia: pt would like to see a surgeon. Will refer to general surgery.

## 2019-04-24 ENCOUNTER — OFFICE VISIT (OUTPATIENT)
Dept: SURGERY | Facility: CLINIC | Age: 64
End: 2019-04-24
Payer: MEDICARE

## 2019-04-24 VITALS
DIASTOLIC BLOOD PRESSURE: 78 MMHG | SYSTOLIC BLOOD PRESSURE: 128 MMHG | HEART RATE: 80 BPM | BODY MASS INDEX: 27.52 KG/M2 | TEMPERATURE: 98 F | HEIGHT: 70 IN | WEIGHT: 192.25 LBS

## 2019-04-24 DIAGNOSIS — N32.1 COLOVESICAL FISTULA: Primary | ICD-10-CM

## 2019-04-24 PROCEDURE — 99999 PR PBB SHADOW E&M-EST. PATIENT-LVL III: ICD-10-PCS | Mod: PBBFAC,,, | Performed by: SURGERY

## 2019-04-24 PROCEDURE — 3008F PR BODY MASS INDEX (BMI) DOCUMENTED: ICD-10-PCS | Mod: CPTII,S$GLB,, | Performed by: SURGERY

## 2019-04-24 PROCEDURE — 99203 OFFICE O/P NEW LOW 30 MIN: CPT | Mod: S$GLB,,, | Performed by: SURGERY

## 2019-04-24 PROCEDURE — 99999 PR PBB SHADOW E&M-EST. PATIENT-LVL III: CPT | Mod: PBBFAC,,, | Performed by: SURGERY

## 2019-04-24 PROCEDURE — 3008F BODY MASS INDEX DOCD: CPT | Mod: CPTII,S$GLB,, | Performed by: SURGERY

## 2019-04-24 PROCEDURE — 99203 PR OFFICE/OUTPT VISIT, NEW, LEVL III, 30-44 MIN: ICD-10-PCS | Mod: S$GLB,,, | Performed by: SURGERY

## 2019-04-24 NOTE — PROGRESS NOTES
History & Physical    SUBJECTIVE:     History of Present Illness:  Patient is a 63 y.o. 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 Patient states he had a URI 6 weeks ago and was coughing incessantly at which time he noticed a large bulge to his right groin associated with pain with activity and intermittent constipation. He also has a history of umbilical hernia repair 2 years ago and notes it feels as though it has recurred. It is sometimes painful but not as bad as his right groin. Finally, he has a left-sided groin bulge which doesn't bother him too much. Has a history of large volume ascites which has improved significantly with medical management. Denies fevers, chills, nausea, vomiting, or any other complaints at this time.     Chief Complaint   Patient presents with    Consult       Review of patient's allergies indicates:   Allergen Reactions    Ativan [lorazepam] Other (See Comments)     confusion    Iodine and iodide containing products Edema    Pcn [penicillins] Edema    Bee sting [allergen ext-venom-honey bee]        Current Outpatient Medications   Medication Sig Dispense Refill    buPROPion (WELLBUTRIN SR) 150 MG TBSR 12 hr tablet Take 1 tablet (150 mg total) by mouth 2 (two) times daily. 180 tablet 3    citalopram (CELEXA) 20 MG tablet Take 1 tablet (20 mg total) by mouth once daily. 90 tablet 3    furosemide (LASIX) 40 MG tablet Take 1.5 tablets (60 mg total) by mouth once daily. 45 tablet 2    nystatin (MYCOSTATIN) cream Apply topically 3 (three) times daily. 60 g 0    pantoprazole (PROTONIX) 40 MG tablet Take 1 tablet (40 mg total) by mouth once daily. 90 tablet 3    spironolactone (ALDACTONE) 100 MG tablet Take 1.5 tablets (150 mg total) by mouth once daily. 45 tablet 2    omega-3 fatty acids-vitamin E (FISH OIL) 1,000 mg Cap Take 1 capsule by mouth 2 (two)  "times daily.  0     No current facility-administered medications for this visit.        Past Medical History:   Diagnosis Date    Bleeding ulcer     Depression     Diverticulitis     Fistula     High cholesterol     Smokes cigarettes      Past Surgical History:   Procedure Laterality Date    HERNIA REPAIR      TIPS N/A 8/9/2016    [facility]    TONSILLECTOMY       Family History   Problem Relation Age of Onset    Diabetes Mother     Heart disease Mother     Heart disease Father      Social History     Tobacco Use    Smoking status: Current Every Day Smoker     Packs/day: 0.50     Years: 43.00     Pack years: 21.50     Types: Cigarettes    Smokeless tobacco: Never Used   Substance Use Topics    Alcohol use: No    Drug use: No        Review of Systems:  Review of Systems   Constitutional: Negative for chills and fever.   HENT: Negative for sore throat.    Eyes: Negative for redness.   Respiratory: Negative for shortness of breath.    Cardiovascular: Negative for chest pain.   Gastrointestinal: Negative for abdominal pain.   Endocrine: Negative for polyuria.   Genitourinary: Negative for dysuria and hematuria.        Pneumouria.    Musculoskeletal: Negative for back pain.        Right groin bulge and pain, umbilical bulge and pain, left groin bulge.    Skin: Negative for rash.   Neurological: Negative for headaches.   Psychiatric/Behavioral: Negative for agitation.       OBJECTIVE:     Vital Signs (Most Recent)  Temp: 97.7 °F (36.5 °C) (04/24/19 0924)  Pulse: 80 (04/24/19 0924)  BP: 128/78 (04/24/19 0924)  5' 10" (1.778 m)  87.2 kg (192 lb 3.9 oz)     Physical Exam:  Physical Exam   Constitutional: He is oriented to person, place, and time. He appears well-developed and well-nourished. No distress.   HENT:   Head: Normocephalic and atraumatic.   Eyes: Conjunctivae are normal.   Neck: Normal range of motion.   Cardiovascular: Normal rate.   Pulmonary/Chest: Effort normal. No " respiratory distress.   Abdominal: Soft. There is tenderness (at hernias).   Large right-sided inguinal hernia repair that is mostly reducible. Tender to deep palpation. Left-sided inguinal hernia that is small and reducible. Small umbilical hernia that is tender with reduction. Small umbilical hernia scar. Well healed.    Musculoskeletal: He exhibits edema (bilateral lower extremity).   Neurological: He is alert and oriented to person, place, and time.   Skin: Skin is warm and dry.   Psychiatric: He has a normal mood and affect. His behavior is normal.   Nursing note and vitals reviewed.      Laboratory  Reviewed INR normal, PETH 77    Diagnostic Results:  US shows no ascites.   No new CT scans    ASSESSMENT/PLAN:     63-year-old male with decompensated liver disease with MELD of 10 and a colovesicular fistula desiring repair of his bilateral inguinal and umbilical hernias. Right inguinal hernia is fairly large and tender with reduction and is probably the highest risk of incarceration and strangulation compared to other hernias. He seems fairly well optimized from a liver standpoint with a normal INR and MELD of 10 with no ascites on last US. However, his larger and more pressing issue is the fact he has a colovesicular fistula. This would need to be repaired before any of his hernias are repaired as he is likely intermittently bacteremic and repairing the hernia in an infected field would have a poor outcome, especially with mesh. Further, a colovesicular fistula repair could possibly require an ostomy and this would complicated the picture of his hernia repair with mesh.     PLAN:  -CT abdomen/pelvis with rectal contrast to better characterize the colovesicular fistula  -Referral to [facility] Dr. Madsen for repair of colovesicular fistula.   -Could possibly repair the the right inguinal hernia at time of colovesicular fistula repair without mesh to temporize his risk of incarceration and strangulation.   -Will  coordinate with SHANI Kitchen, PGY1  General Surgery  [phone]

## 2019-04-24 NOTE — LETTER
April 29, 2019      Elyssa Hyatt MD  [address] [facility] [address]           [facility] General Surgery  [address]  [facility] [address]  Phone: [phone]          Patient: Dexter Robertson   MR Number: [id number]   YOB: 1955   Date of Visit: 4/24/2019       Dear Dr. Elyssa Hyatt:    Thank you for referring Dexter Robertson to me for evaluation. Attached you will find relevant portions of my assessment and plan of care.    If you have questions, please do not hesitate to call me. I look forward to following Dexter Robertson along with you.    Sincerely,    Humberto Frias MD    Enclosure  CC:  No Recipients    If you would like to receive this communication electronically, please contact [email or web address] or [phone] to request more information on [employer] Link access.    For providers and/or their staff who would like to refer a patient to [facility], please contact us through our one-stop-shop provider referral line, [facility], at [phone].    If you feel you have received this communication in error or would no longer like to receive these types of communications, please e-mail [email or web address]

## 2019-05-16 ENCOUNTER — OFFICE VISIT (OUTPATIENT)
Dept: SURGERY | Facility: CLINIC | Age: 64
End: 2019-05-16
Payer: MEDICARE

## 2019-05-16 VITALS
HEIGHT: 70 IN | WEIGHT: 194.88 LBS | BODY MASS INDEX: 27.9 KG/M2 | DIASTOLIC BLOOD PRESSURE: 82 MMHG | HEART RATE: 91 BPM | SYSTOLIC BLOOD PRESSURE: 119 MMHG

## 2019-05-16 DIAGNOSIS — K63.2 COLONIC FISTULA: Primary | ICD-10-CM

## 2019-05-16 PROCEDURE — 3008F PR BODY MASS INDEX (BMI) DOCUMENTED: ICD-10-PCS | Mod: CPTII,S$GLB,, | Performed by: COLON & RECTAL SURGERY

## 2019-05-16 PROCEDURE — 3008F BODY MASS INDEX DOCD: CPT | Mod: CPTII,S$GLB,, | Performed by: COLON & RECTAL SURGERY

## 2019-05-16 PROCEDURE — 99214 PR OFFICE/OUTPT VISIT, EST, LEVL IV, 30-39 MIN: ICD-10-PCS | Mod: S$GLB,,, | Performed by: COLON & RECTAL SURGERY

## 2019-05-16 PROCEDURE — 99214 OFFICE O/P EST MOD 30 MIN: CPT | Mod: S$GLB,,, | Performed by: COLON & RECTAL SURGERY

## 2019-05-16 PROCEDURE — 99999 PR PBB SHADOW E&M-EST. PATIENT-LVL III: ICD-10-PCS | Mod: PBBFAC,,, | Performed by: COLON & RECTAL SURGERY

## 2019-05-16 PROCEDURE — 99999 PR PBB SHADOW E&M-EST. PATIENT-LVL III: CPT | Mod: PBBFAC,,, | Performed by: COLON & RECTAL SURGERY

## 2019-05-16 RX ORDER — ACETAMINOPHEN 325 MG/1
325 TABLET ORAL EVERY 6 HOURS PRN
COMMUNITY

## 2019-05-16 NOTE — PROGRESS NOTES
History & Physical  Surgery      SUBJECTIVE:     Chief Complaint/Reason for Admission: Colovesicular fistula    History of Present Illness: Dexter Robertson is a 63 y.o. male with hx of alcoholic liver cirrhosis with ascites presents as a referral from General Surgery clinic after being evaluated for bilateral and umbilical hernias for further evaluation of colovesicular fistula. He underwent CT scan which showed no ascities but did confirm the presence of a colovesicular fistula. He is currently having issues with urinary incontinence to the point that his testicles area irritated. Occurring since 1.5 years ago. Also notes air bubbles in his urine. This began in conjunction with his incrased right inguinal pain in March. No dysuria, abnormal frequency, change in color, pelvic pain, fever, chills, nausea, or vomiting.    Current MELD 9  Aubrey Ochoa Class A    Last colonoscopy 2 - 3 years ago in [facility].      Current Outpatient Medications on File Prior to Visit   Medication Sig    buPROPion (WELLBUTRIN SR) 150 MG TBSR 12 hr tablet Take 1 tablet (150 mg total) by mouth 2 (two) times daily.    citalopram (CELEXA) 20 MG tablet Take 1 tablet (20 mg total) by mouth once daily.    furosemide (LASIX) 40 MG tablet Take 1.5 tablets (60 mg total) by mouth once daily.    nystatin (MYCOSTATIN) cream Apply topically 3 (three) times daily.    omega-3 fatty acids-vitamin E (FISH OIL) 1,000 mg Cap Take 1 capsule by mouth 2 (two) times daily.    pantoprazole (PROTONIX) 40 MG tablet Take 1 tablet (40 mg total) by mouth once daily.    spironolactone (ALDACTONE) 100 MG tablet Take 1.5 tablets (150 mg total) by mouth once daily.     No current facility-administered medications on file prior to visit.        Review of patient's allergies indicates:   Allergen Reactions    Ativan [lorazepam] Other (See Comments)     confusion    Iodine and iodide containing products Edema    Pcn [penicillins] Edema     Bee sting [allergen ext-venom-honey bee]        Past Medical History:   Diagnosis Date    Bleeding ulcer     Depression     Diverticulitis     Fistula     High cholesterol     Smokes cigarettes      Past Surgical History:   Procedure Laterality Date    HERNIA REPAIR      TIPS N/A 8/9/2016    [facility]    TONSILLECTOMY       Family History   Problem Relation Age of Onset    Diabetes Mother     Heart disease Mother     Heart disease Father      Social History     Tobacco Use    Smoking status: Current Every Day Smoker     Packs/day: 0.50     Years: 43.00     Pack years: 21.50     Types: Cigarettes    Smokeless tobacco: Never Used   Substance Use Topics    Alcohol use: No    Drug use: No        Review of Systems   Constitutional: Negative for chills, diaphoresis and fever.   HENT: Negative for congestion and sore throat.    Respiratory: Negative for shortness of breath and wheezing.    Cardiovascular: Negative for chest pain and palpitations.   Gastrointestinal: Negative for abdominal distention, abdominal pain, nausea and vomiting.   Genitourinary: Negative for difficulty urinating, dysuria, frequency and hematuria.        Stress vs overflow Incontinence  Pneumoturia   Musculoskeletal: Negative for arthralgias and myalgias.   Skin: Positive for color change (testicular irritation) and rash.   Neurological: Negative for light-headedness and headaches.   Psychiatric/Behavioral: Negative for confusion and hallucinations.     OBJECTIVE:     Vital Signs (Most Recent)  Pulse: 91 (05/16/19 0832)  BP: 119/82 (05/16/19 0832)    Physical Exam   Constitutional: He is oriented to person, place, and time. He appears well-developed and well-nourished. No distress.   HENT:   Head: Normocephalic and atraumatic.   Eyes: Conjunctivae and EOM are normal.   Cardiovascular: Normal rate, regular rhythm and intact distal pulses.   Pulmonary/Chest: Effort normal and breath sounds normal. No  respiratory distress.   Abdominal: Soft. He exhibits no distension. There is no tenderness. A hernia (Bilateral inguinal hernias, both TTP and reducible, R > L) is present.   Umbilical hernia, small, reducible   Musculoskeletal: Normal range of motion. He exhibits edema (BLE 1+).   Neurological: He is alert and oriented to person, place, and time.   Skin: Skin is warm and dry. Rash (testicular) noted. There is erythema (testicular).   Psychiatric: He has a normal mood and affect. Thought content normal.   Nursing note and vitals reviewed.    Laboratory  CBC: reviewed  CMP: reviewed  Coagulation: reviewed      Diagnostic Results:  CT: Reviewed    ASSESSMENT/PLAN:     A/P: 62 yo with hx of alcoholic liver cirrhosis with ascites presents to clinic as a referral from general surgery for evaluation of colovesicular fistula. His symptoms have worsening since appearance of inguinal hernias in March.       Recommend fixing hernias now as this is causing him the most distress.   Follow up in clinic once hernias are repaired.  Needs referral to urology.  Will obtain report from most recent colonoscopy.    Antoni Carrasquillo MD  Surgery, PGY1  [phone]

## 2019-05-16 NOTE — LETTER
May 18, 2019      Humberto Frias MD  [address]  [facility] [address]           [facility]  [address]  [facility] [address]  Phone: [phone]          Patient: Dexter Robertson   MR Number: [id number]   YOB: 1955   Date of Visit: 5/16/2019       Dear Dr. Humberto Frias:    Thank you for referring Dexter Robertson to me for evaluation. Attached you will find relevant portions of my assessment and plan of care.    If you have questions, please do not hesitate to call me. I look forward to following Dexter Robertson along with you.    Sincerely,    Chago Madsen MD    Enclosure  CC:  No Recipients    If you would like to receive this communication electronically, please contact [email or web address] or [phone] to request more information on [email or web address] Link access.    For providers and/or their staff who would like to refer a patient to [facility], please contact us through our one-stop-shop provider referral line, [facility], at [phone].    If you feel you have received this communication in error or would no longer like to receive these types of communications, please e-mail [email or web address]

## 2019-05-29 ENCOUNTER — OFFICE VISIT (OUTPATIENT)
Dept: SURGERY | Facility: CLINIC | Age: 64
End: 2019-05-29
Payer: MEDICARE

## 2019-05-29 ENCOUNTER — HOSPITAL ENCOUNTER (OUTPATIENT)
Dept: CARDIOLOGY | Facility: CLINIC | Age: 64
Discharge: HOME | End: 2019-05-29
Payer: MEDICARE

## 2019-05-29 VITALS
SYSTOLIC BLOOD PRESSURE: 135 MMHG | WEIGHT: 197.44 LBS | DIASTOLIC BLOOD PRESSURE: 80 MMHG | HEIGHT: 70 IN | TEMPERATURE: 98 F | BODY MASS INDEX: 28.27 KG/M2 | HEART RATE: 91 BPM

## 2019-05-29 DIAGNOSIS — K40.90 NON-RECURRENT INGUINAL HERNIA OF RIGHT SIDE: Primary | ICD-10-CM

## 2019-05-29 DIAGNOSIS — K40.90 RIGHT INGUINAL HERNIA: ICD-10-CM

## 2019-05-29 DIAGNOSIS — K40.90 RIGHT INGUINAL HERNIA: Primary | ICD-10-CM

## 2019-05-29 PROCEDURE — 93010 ELECTROCARDIOGRAM REPORT: CPT | Mod: S$GLB,,, | Performed by: INTERNAL MEDICINE

## 2019-05-29 PROCEDURE — 93010 EKG 12-LEAD: ICD-10-PCS | Mod: S$GLB,,, | Performed by: INTERNAL MEDICINE

## 2019-05-29 PROCEDURE — 93005 ELECTROCARDIOGRAM TRACING: CPT | Mod: S$GLB,,, | Performed by: SURGERY

## 2019-05-29 PROCEDURE — 99213 OFFICE O/P EST LOW 20 MIN: CPT | Mod: S$GLB,,, | Performed by: SURGERY

## 2019-05-29 PROCEDURE — 3008F PR BODY MASS INDEX (BMI) DOCUMENTED: ICD-10-PCS | Mod: CPTII,S$GLB,, | Performed by: SURGERY

## 2019-05-29 PROCEDURE — 3008F BODY MASS INDEX DOCD: CPT | Mod: CPTII,S$GLB,, | Performed by: SURGERY

## 2019-05-29 PROCEDURE — 99213 PR OFFICE/OUTPT VISIT, EST, LEVL III, 20-29 MIN: ICD-10-PCS | Mod: S$GLB,,, | Performed by: SURGERY

## 2019-05-29 PROCEDURE — 93005 EKG 12-LEAD: ICD-10-PCS | Mod: S$GLB,,, | Performed by: SURGERY

## 2019-05-29 PROCEDURE — 99999 PR PBB SHADOW E&M-EST. PATIENT-LVL III: ICD-10-PCS | Mod: PBBFAC,,, | Performed by: SURGERY

## 2019-05-29 PROCEDURE — 99999 PR PBB SHADOW E&M-EST. PATIENT-LVL III: CPT | Mod: PBBFAC,,, | Performed by: SURGERY

## 2019-05-29 NOTE — PROGRESS NOTES
History & Physical    SUBJECTIVE:     History of Present Illness:  Patient is a 63 y.o. male presents with symptomatic right inguinal hernia. Onset of symptoms was gradual starting several months ago with gradually worsening course since that time. Patient denies symptoms of incarceration but does describe constipation. Symptoms are aggravated by activity. Symptoms improve with rest.      Chief Complaint   Patient presents with    Follow-up       Review of patient's allergies indicates:   Allergen Reactions    Ativan [lorazepam] Other (See Comments)     confusion    Iodine and iodide containing products Edema    Pcn [penicillins] Edema    Bee sting [allergen ext-venom-honey bee]        Current Outpatient Medications   Medication Sig Dispense Refill    acetaminophen (TYLENOL) 325 MG tablet Take 325 mg by mouth every 6 (six) hours as needed for Pain.      buPROPion (WELLBUTRIN SR) 150 MG TBSR 12 hr tablet Take 1 tablet (150 mg total) by mouth 2 (two) times daily. 180 tablet 3    citalopram (CELEXA) 20 MG tablet Take 1 tablet (20 mg total) by mouth once daily. 90 tablet 3    furosemide (LASIX) 40 MG tablet TAKE 1 & 1/2 TABLETS BY MOUTH ONCE DAILY 45 tablet 2    nystatin (MYCOSTATIN) cream Apply topically 3 (three) times daily. 60 g 0    pantoprazole (PROTONIX) 40 MG tablet Take 1 tablet (40 mg total) by mouth once daily. 90 tablet 3    spironolactone (ALDACTONE) 100 MG tablet Take 1.5 tablets (150 mg total) by mouth once daily. 45 tablet 2    omega-3 fatty acids-vitamin E (FISH OIL) 1,000 mg Cap Take 1 capsule by mouth 2 (two) times daily.  0     No current facility-administered medications for this visit.        Past Medical History:   Diagnosis Date    Bleeding ulcer     Depression     Diverticulitis     Fistula     High cholesterol     Smokes cigarettes      Past Surgical History:   Procedure Laterality Date    HERNIA REPAIR      TIPS N/A 8/9/2016    Performed by Maria Del [facility] Surgeon at [facility]     "TONSILLECTOMY       Family History   Problem Relation Age of Onset    Diabetes Mother     Heart disease Mother     Heart disease Father      Social History     Tobacco Use    Smoking status: Current Every Day Smoker     Packs/day: 0.50     Years: 43.00     Pack years: 21.50     Types: Cigarettes    Smokeless tobacco: Never Used   Substance Use Topics    Alcohol use: No    Drug use: No        Review of Systems:      I have reviewed the Patient Summary Reports.        Review of Systems  Anesthesia Hx:  No problems with previous Anesthesia    Hematology/Oncology:  Hematology Normal   Oncology Normal     Hepatic/GI:   PUD, Liver Disease,    Neurological:  Neurology Normal    Psych:   Psychiatric History          OBJECTIVE:     Vital Signs (Most Recent)  Temp: 97.9 °F (36.6 °C) (05/29/19 0905)  Pulse: 91 (05/29/19 0905)  BP: 135/80 (05/29/19 0905)  5' 10" (1.778 m)  89.5 kg (197 lb 6.8 oz)     Physical Exam:    Physical Exam  General:  Well nourished    Airway/Jaw/Neck:  AIRWAY FINDINGS: Normal      Eyes/Ears/Nose:  EYES/EARS/NOSE FINDINGS: Normal    Chest/Lungs:  Chest/Lungs Clear    Heart/Vascular:  Heart Findings: Normal Heart murmur: negative    Abdomen:  Abdomen Findings:  Bilateral inguinal herbnias larger on right  umbillical hernia            Laboratory  none    Diagnostic Results:  CT: Reviewed  bilateral inguinal hernias worse on right    ASSESSMENT/PLAN:     Right inguinal hernia in male with colovesicle fistula    PLAN:Plan     celestina attempt primary repair without mesh in symptomatic side.(right)       "

## 2019-05-29 NOTE — PATIENT INSTRUCTIONS
What Is a Hernia?    A hernia is when an organ or tissue pushes through a weak area in the belly (abdominal) wall. This weak area may be present at birth. Or it may be caused by abdominal strain over time. If not treated, a hernia can get worse with time and physical stress.  When a bulge forms  When there is a weak area in the abdominal wall, an organ or tissue can push outward. This often causes a bulge that you can see under your skin. The bulge may get bigger when you stand up. It may go away when you lie down. You may also feel some pressure or mild pain when lifting, coughing, urinating, or doing other activities.  Types of hernias  The type of hernia you have depends on its location. Most hernias form in the groin at or near the internal ring. This is the entrance to a canal between the abdomen and groin. Hernias can also occur in the abdomen, thigh, or genitals.  · An incisional hernia occurs at the site of a previous surgical incision.  · An umbilical hernia occurs at the navel.  · An indirect inguinal hernia occurs in the groin at the internal ring.  · A direct inguinal hernia occurs in the groin near the internal ring.  · A femoral hernia occurs just below the groin.  · An epigastric hernia occurs in the upper abdomen at the midline.  Diagnosis  In most cases, your healthcare provider can diagnose a hernia by doing a physical exam.  In some cases it might not be clear why you have a swelling in the belly wall. Then your provider may order an imaging test such as an ultrasound. This can help with the diagnosis.  Surgery  A hernia will not heal on its own. Surgery is needed to fix the weak spot in the abdominal wall. If not treated, a hernia can get larger. It can also cause serious health problems. The good news is that hernia surgery can be done quickly and safely. In some cases, you can go home the same day as your surgery.   When to call your provider  Call your healthcare provider right away if the  swelling around your hernia becomes larger, firmer, or more painful. These may be signs that your intestines are stuck in the abdominal wall. This is an emergency. The hernia must be repaired right away to avoid serious problems.  Date Last Reviewed: 7/1/2016  © 2397-9138 [employer]. [address] [place] [address]. All rights reserved. This information is not intended as a substitute for professional medical care. Always follow your healthcare professional's instructions.        How a Hernia Develops  Although a hernia bulge may appear suddenly, hernias often take years to develop. They grow larger as pressure inside the body presses the intestines or other tissues out through a weak area in the abdominal wall, often at the belly button or a site of previous surgery. With time, these tissues can bulge out beneath the skin.  Stages of hernia development    The wall weakens or tears. The abdominal lining bulges out through a weak area and begins to form a hernia sac. The sac may contain fat, intestine, or other tissues. At this point, the hernia may or may not cause a visible bulge.        The intestine pushes into the sac. As the intestine pushes further into the sac, it forms a visible bulge. The bulge may flatten when you lie down or push against it. This is called a reducible hernia and does not cause any immediate danger.             The intestine may become trapped. The sac containing the intestine may become trapped by muscle (incarcerated). If this happens, you wont be able to flatten the bulge. You may also have pain. Prompt treatment is needed.        The intestine may become strangulated. If the intestine is tightly trapped, it becomes strangulated. The strangulated area loses blood supply and may die. This can cause severe pain and block the intestine. Emergency surgery is needed.   Date Last Reviewed: 8/1/2016  © 2297-8551 [employer]. [address] [place] [address],  [place]un[place] [address]. All rights reserved. This information is not intended as a substitute for professional medical care. Always follow your healthcare professional's instructions.        Having Hernia Surgery: Patch Repair  Surgery treats a hernia by repairing the weakness in the abdominal wall. An incision is made so the surgeon has a direct view of the hernia. The repair is then done through this incision (open surgery). To repair the defect, special mesh materials are used to patch the weak area and make a tension-free repair. Follow your healthcare providers advice on how to get ready for the procedure. You can usually go home the same day as your surgery. In some cases, though, you may need to stay in the hospital overnight.  Getting ready for surgery  Your healthcare provider will talk with you about preparing for surgery. Follow all the instructions youre given and be sure to:   · Tell your healthcare provider about any medicines, supplements, or herbs you take. This includes both prescription and over-the-counter items.  · Stop taking aspirin, ibuprofen, naproxen and other NSAIDs as directed.  · Arrange for an adult family member or friend to give you a ride home after surgery.  · Stop smoking. Smoking affects blood flow and can slow healing.  · Gently wash the surgical area the night before surgery.  · Follow any directions you are given for not eating or drinking before surgery.     Repair in Front           Repair in Back           Combination Repair      The day of surgery  Arrive at the hospital or surgical center at your scheduled time. Youll be asked to change into a patient gown. Youll then be given an IV to provide fluids and medicine. Shortly before surgery, an anesthesiologist or nurse anesthetist will talk with you. He or she will explain the types of anesthesia used to prevent pain during surgery. You will have one or more of the following:  · Monitored sedation to make you relaxed and  sleepy.  · Local anesthesia to numb the surgical site.  · Regional anesthesia to numb specific areas of your body.  · General anesthesia to let you sleep during surgery.  During the surgery  Most hernias are treated using tension-free repairs. This is surgery that uses special mesh materials to repair the weak area. Unlike traditional repairs, the abdominal fascia (tissue around the muscle that gives strength to the abdominal wall) isnt sewn together. Instead, the mesh covers the weak area like a patch. This repairs the defect without tension on the muscles. It also makes it less likely to happen again. The mesh is made of strong, flexible plastic that stays in the body. Over time, nearby tissues grow into the mesh to strengthen the repair.  After surgery  When the procedure is over, youll be taken to the recovery area to rest. Your blood pressure and heart rate will be monitored. Youll also have a bandage over the surgical site. To help reduce discomfort, youll be given pain medicines. You may also be given breathing exercises to keep your lungs clear. Later, youll be asked to get up and walk. This helps prevent blood clots in the legs. You can go home when your healthcare provider says youre ready.     Risks and complications  Hernia surgery is safe, but does have risks including:  · Bleeding  · Infection  · Anesthesia risks  · Mesh complications  · Inability to urinate   · Bowel or bladder injury   · Numbness or pain in the groin or leg  · Risk the hernia will happen again  · Damage to the testicles or testicular function      Date Last Reviewed: 10/1/2016  © 9437-9874 [employer]. [address] [place] [address], [place] [address]. All rights reserved. This information is not intended as a substitute for professional medical care. Always follow your healthcare professional's instructions.

## 2019-05-29 NOTE — H&P (VIEW-ONLY)
History & Physical    SUBJECTIVE:     History of Present Illness:  Patient is a 63 y.o. male presents with symptomatic right inguinal hernia. Onset of symptoms was gradual starting several months ago with gradually worsening course since that time. Patient denies symptoms of incarceration but does describe constipation. Symptoms are aggravated by activity. Symptoms improve with rest.      Chief Complaint   Patient presents with    Follow-up       Review of patient's allergies indicates:   Allergen Reactions    Ativan [lorazepam] Other (See Comments)     confusion    Iodine and iodide containing products Edema    Pcn [penicillins] Edema    Bee sting [allergen ext-venom-honey bee]        Current Outpatient Medications   Medication Sig Dispense Refill    acetaminophen (TYLENOL) 325 MG tablet Take 325 mg by mouth every 6 (six) hours as needed for Pain.      buPROPion (WELLBUTRIN SR) 150 MG TBSR 12 hr tablet Take 1 tablet (150 mg total) by mouth 2 (two) times daily. 180 tablet 3    citalopram (CELEXA) 20 MG tablet Take 1 tablet (20 mg total) by mouth once daily. 90 tablet 3    furosemide (LASIX) 40 MG tablet TAKE 1 & 1/2 TABLETS BY MOUTH ONCE DAILY 45 tablet 2    nystatin (MYCOSTATIN) cream Apply topically 3 (three) times daily. 60 g 0    pantoprazole (PROTONIX) 40 MG tablet Take 1 tablet (40 mg total) by mouth once daily. 90 tablet 3    spironolactone (ALDACTONE) 100 MG tablet Take 1.5 tablets (150 mg total) by mouth once daily. 45 tablet 2    omega-3 fatty acids-vitamin E (FISH OIL) 1,000 mg Cap Take 1 capsule by mouth 2 (two) times daily.  0     No current facility-administered medications for this visit.        Past Medical History:   Diagnosis Date    Bleeding ulcer     Depression     Diverticulitis     Fistula     High cholesterol     Smokes cigarettes      Past Surgical History:   Procedure Laterality Date    HERNIA REPAIR      TIPS N/A 8/9/2016    Performed by Maria Del Rosario Surgeon at [facility]     "TONSILLECTOMY       Family History   Problem Relation Age of Onset    Diabetes Mother     Heart disease Mother     Heart disease Father      Social History     Tobacco Use    Smoking status: Current Every Day Smoker     Packs/day: 0.50     Years: 43.00     Pack years: 21.50     Types: Cigarettes    Smokeless tobacco: Never Used   Substance Use Topics    Alcohol use: No    Drug use: No        Review of Systems:      I have reviewed the Patient Summary Reports.        Review of Systems  Anesthesia Hx:  No problems with previous Anesthesia    Hematology/Oncology:  Hematology Normal   Oncology Normal     Hepatic/GI:   PUD, Liver Disease,    Neurological:  Neurology Normal    Psych:   Psychiatric History          OBJECTIVE:     Vital Signs (Most Recent)  Temp: 97.9 °F (36.6 °C) (05/29/19 0905)  Pulse: 91 (05/29/19 0905)  BP: 135/80 (05/29/19 0905)  5' 10" (1.778 m)  89.5 kg (197 lb 6.8 oz)     Physical Exam:    Physical Exam  General:  Well nourished    Airway/Jaw/Neck:  AIRWAY FINDINGS: Normal      Eyes/Ears/Nose:  EYES/EARS/NOSE FINDINGS: Normal    Chest/Lungs:  Chest/Lungs Clear    Heart/Vascular:  Heart Findings: Normal Heart murmur: negative    Abdomen:  Abdomen Findings:  Bilateral inguinal herbnias larger on right  umbillical hernia            Laboratory  none    Diagnostic Results:  CT: Reviewed  bilateral inguinal hernias worse on right    ASSESSMENT/PLAN:     Right inguinal hernia in male with colovesicle fistula    PLAN:Plan     celestina attempt primary repair without mesh in symptomatic side.(right)       "

## 2019-06-12 DIAGNOSIS — K40.90 RIGHT INGUINAL HERNIA: ICD-10-CM

## 2019-06-12 RX ORDER — SODIUM CHLORIDE 9 MG/ML
INJECTION, SOLUTION INTRAVENOUS CONTINUOUS
Status: CANCELLED | OUTPATIENT
Start: 2019-06-12

## 2019-06-17 ENCOUNTER — TELEPHONE (OUTPATIENT)
Dept: SURGERY | Facility: CLINIC | Age: 64
End: 2019-06-17

## 2019-06-17 ENCOUNTER — ANESTHESIA EVENT (OUTPATIENT)
Dept: SURGERY | Facility: HOSPITAL | Age: 64
End: 2019-06-17
Payer: MEDICARE

## 2019-06-17 NOTE — PRE-PROCEDURE INSTRUCTIONS
Preop instructions: NPO solids/ milk products after midnight or clears up to 2 hours before arrival (clear liquids are: water, apple juice, Gatorade & Jell-O, black coffee/no milk, cream or creamer), shower instructions, directions, leave all valuables at home, medication instructions for PM prior & am of procedure explained. Patient stated an understanding.      Patient denies any side effects or issues with anesthesia or sedation.                                                                                                                                    Patient does not know arrival time. Explained that this information comes from the surgeons office and if they have not heard from them by 2 pm, to call office. Patient stated an understanding.

## 2019-06-18 ENCOUNTER — ANESTHESIA (OUTPATIENT)
Dept: SURGERY | Facility: HOSPITAL | Age: 64
End: 2019-06-18
Payer: MEDICARE

## 2019-06-18 ENCOUNTER — HOSPITAL ENCOUNTER (OUTPATIENT)
Facility: HOSPITAL | Age: 64
Discharge: HOME | End: 2019-06-18
Attending: SURGERY | Admitting: SURGERY
Payer: MEDICARE

## 2019-06-18 DIAGNOSIS — K40.90 RIGHT INGUINAL HERNIA: Primary | ICD-10-CM

## 2019-06-18 PROCEDURE — 25000003 PHARM REV CODE 250: Performed by: STUDENT IN AN ORGANIZED HEALTH CARE EDUCATION/TRAINING PROGRAM

## 2019-06-18 PROCEDURE — 63600175 PHARM REV CODE 636 W HCPCS: Performed by: STUDENT IN AN ORGANIZED HEALTH CARE EDUCATION/TRAINING PROGRAM

## 2019-06-18 PROCEDURE — 25000003 PHARM REV CODE 250: Performed by: PHYSICIAN ASSISTANT

## 2019-06-18 PROCEDURE — 27000221 HC OXYGEN, UP TO 24 HOURS

## 2019-06-18 PROCEDURE — 49505 PRP I/HERN INIT REDUC >5 YR: CPT | Mod: RT,,, | Performed by: SURGERY

## 2019-06-18 PROCEDURE — 88302 TISSUE SPECIMEN TO PATHOLOGY - SURGERY: ICD-10-PCS | Mod: 26,,, | Performed by: PATHOLOGY

## 2019-06-18 PROCEDURE — 49505 PR REPAIR ING HERNIA,5+Y/O,REDUCIBL: ICD-10-PCS | Mod: RT,,, | Performed by: SURGERY

## 2019-06-18 PROCEDURE — 88302 TISSUE EXAM BY PATHOLOGIST: CPT | Mod: 26,,, | Performed by: PATHOLOGY

## 2019-06-18 PROCEDURE — 37000008 HC ANESTHESIA 1ST 15 MINUTES: Performed by: SURGERY

## 2019-06-18 PROCEDURE — 94761 N-INVAS EAR/PLS OXIMETRY MLT: CPT

## 2019-06-18 PROCEDURE — 36000706: Performed by: SURGERY

## 2019-06-18 PROCEDURE — 25000003 PHARM REV CODE 250: Performed by: SURGERY

## 2019-06-18 PROCEDURE — D9220A PRA ANESTHESIA: ICD-10-PCS | Mod: ,,, | Performed by: ANESTHESIOLOGY

## 2019-06-18 PROCEDURE — 71000015 HC POSTOP RECOV 1ST HR: Performed by: SURGERY

## 2019-06-18 PROCEDURE — 36000707: Performed by: SURGERY

## 2019-06-18 PROCEDURE — 88302 TISSUE EXAM BY PATHOLOGIST: CPT | Performed by: PATHOLOGY

## 2019-06-18 PROCEDURE — 63600175 PHARM REV CODE 636 W HCPCS: Performed by: ANESTHESIOLOGY

## 2019-06-18 PROCEDURE — S0077 INJECTION, CLINDAMYCIN PHOSP: HCPCS | Performed by: PHYSICIAN ASSISTANT

## 2019-06-18 PROCEDURE — D9220A PRA ANESTHESIA: Mod: ,,, | Performed by: ANESTHESIOLOGY

## 2019-06-18 PROCEDURE — 71000033 HC RECOVERY, INTIAL HOUR: Performed by: SURGERY

## 2019-06-18 PROCEDURE — S0020 INJECTION, BUPIVICAINE HYDRO: HCPCS | Performed by: SURGERY

## 2019-06-18 PROCEDURE — 71000039 HC RECOVERY, EACH ADD'L HOUR: Performed by: SURGERY

## 2019-06-18 PROCEDURE — 37000009 HC ANESTHESIA EA ADD 15 MINS: Performed by: SURGERY

## 2019-06-18 RX ORDER — ACETAMINOPHEN 10 MG/ML
INJECTION, SOLUTION INTRAVENOUS
Status: DISCONTINUED | OUTPATIENT
Start: 2019-06-18 | End: 2019-06-18

## 2019-06-18 RX ORDER — FENTANYL CITRATE 50 UG/ML
25 INJECTION, SOLUTION INTRAMUSCULAR; INTRAVENOUS EVERY 5 MIN PRN
Status: DISCONTINUED | OUTPATIENT
Start: 2019-06-18 | End: 2019-06-18 | Stop reason: HOSPADM

## 2019-06-18 RX ORDER — PROPOFOL 10 MG/ML
VIAL (ML) INTRAVENOUS
Status: DISCONTINUED | OUTPATIENT
Start: 2019-06-18 | End: 2019-06-18

## 2019-06-18 RX ORDER — CLINDAMYCIN PHOSPHATE 900 MG/50ML
900 INJECTION, SOLUTION INTRAVENOUS
Status: COMPLETED | OUTPATIENT
Start: 2019-06-18 | End: 2019-06-18

## 2019-06-18 RX ORDER — LIDOCAINE HYDROCHLORIDE 10 MG/ML
1 INJECTION, SOLUTION EPIDURAL; INFILTRATION; INTRACAUDAL; PERINEURAL ONCE
Status: COMPLETED | OUTPATIENT
Start: 2019-06-18 | End: 2019-06-18

## 2019-06-18 RX ORDER — OXYCODONE HYDROCHLORIDE 5 MG/1
TABLET ORAL
Status: DISCONTINUED
Start: 2019-06-18 | End: 2019-06-18 | Stop reason: HOSPADM

## 2019-06-18 RX ORDER — ROCURONIUM BROMIDE 10 MG/ML
INJECTION, SOLUTION INTRAVENOUS
Status: DISCONTINUED | OUTPATIENT
Start: 2019-06-18 | End: 2019-06-18

## 2019-06-18 RX ORDER — HYDROMORPHONE HYDROCHLORIDE 1 MG/ML
0.2 INJECTION, SOLUTION INTRAMUSCULAR; INTRAVENOUS; SUBCUTANEOUS EVERY 5 MIN PRN
Status: DISCONTINUED | OUTPATIENT
Start: 2019-06-18 | End: 2019-06-18 | Stop reason: HOSPADM

## 2019-06-18 RX ORDER — SODIUM CHLORIDE 9 MG/ML
INJECTION, SOLUTION INTRAVENOUS CONTINUOUS
Status: DISCONTINUED | OUTPATIENT
Start: 2019-06-18 | End: 2019-06-18 | Stop reason: HOSPADM

## 2019-06-18 RX ORDER — OXYCODONE HYDROCHLORIDE 5 MG/1
5 TABLET ORAL
Qty: 31 TABLET | Refills: 0 | Status: ON HOLD | OUTPATIENT
Start: 2019-06-18 | End: 2020-08-22 | Stop reason: HOSPADM

## 2019-06-18 RX ORDER — ONDANSETRON 2 MG/ML
INJECTION INTRAMUSCULAR; INTRAVENOUS
Status: DISCONTINUED | OUTPATIENT
Start: 2019-06-18 | End: 2019-06-18

## 2019-06-18 RX ORDER — LIDOCAINE HCL/PF 100 MG/5ML
SYRINGE (ML) INTRAVENOUS
Status: DISCONTINUED | OUTPATIENT
Start: 2019-06-18 | End: 2019-06-18

## 2019-06-18 RX ORDER — DEXAMETHASONE SODIUM PHOSPHATE 4 MG/ML
INJECTION, SOLUTION INTRA-ARTICULAR; INTRALESIONAL; INTRAMUSCULAR; INTRAVENOUS; SOFT TISSUE
Status: DISCONTINUED | OUTPATIENT
Start: 2019-06-18 | End: 2019-06-18

## 2019-06-18 RX ORDER — OXYCODONE HYDROCHLORIDE 5 MG/1
5 TABLET ORAL EVERY 4 HOURS PRN
Status: DISCONTINUED | OUTPATIENT
Start: 2019-06-18 | End: 2019-06-18 | Stop reason: HOSPADM

## 2019-06-18 RX ORDER — HYDROMORPHONE HYDROCHLORIDE 1 MG/ML
INJECTION, SOLUTION INTRAMUSCULAR; INTRAVENOUS; SUBCUTANEOUS
Status: DISCONTINUED
Start: 2019-06-18 | End: 2019-06-18 | Stop reason: HOSPADM

## 2019-06-18 RX ORDER — MIDAZOLAM HYDROCHLORIDE 1 MG/ML
INJECTION, SOLUTION INTRAMUSCULAR; INTRAVENOUS
Status: DISCONTINUED | OUTPATIENT
Start: 2019-06-18 | End: 2019-06-18

## 2019-06-18 RX ORDER — SODIUM CHLORIDE 9 MG/ML
INJECTION, SOLUTION INTRAVENOUS CONTINUOUS PRN
Status: DISCONTINUED | OUTPATIENT
Start: 2019-06-18 | End: 2019-06-18

## 2019-06-18 RX ORDER — BUPIVACAINE HYDROCHLORIDE 5 MG/ML
INJECTION, SOLUTION EPIDURAL; INTRACAUDAL
Status: DISCONTINUED | OUTPATIENT
Start: 2019-06-18 | End: 2019-06-18 | Stop reason: HOSPADM

## 2019-06-18 RX ORDER — FENTANYL CITRATE 50 UG/ML
INJECTION, SOLUTION INTRAMUSCULAR; INTRAVENOUS
Status: DISCONTINUED | OUTPATIENT
Start: 2019-06-18 | End: 2019-06-18

## 2019-06-18 RX ORDER — KETAMINE HCL IN 0.9 % NACL 50 MG/5 ML
SYRINGE (ML) INTRAVENOUS
Status: DISCONTINUED | OUTPATIENT
Start: 2019-06-18 | End: 2019-06-18

## 2019-06-18 RX ORDER — DIPHENHYDRAMINE HYDROCHLORIDE 50 MG/ML
25 INJECTION INTRAMUSCULAR; INTRAVENOUS EVERY 6 HOURS PRN
Status: DISCONTINUED | OUTPATIENT
Start: 2019-06-18 | End: 2019-06-18 | Stop reason: HOSPADM

## 2019-06-18 RX ADMIN — HYDROMORPHONE HYDROCHLORIDE 0.2 MG: 1 INJECTION, SOLUTION INTRAMUSCULAR; INTRAVENOUS; SUBCUTANEOUS at 08:06

## 2019-06-18 RX ADMIN — MIDAZOLAM HYDROCHLORIDE 2 MG: 1 INJECTION, SOLUTION INTRAMUSCULAR; INTRAVENOUS at 06:06

## 2019-06-18 RX ADMIN — ROCURONIUM BROMIDE 90 MG: 10 INJECTION, SOLUTION INTRAVENOUS at 07:06

## 2019-06-18 RX ADMIN — SUGAMMADEX 168 MG: 100 INJECTION, SOLUTION INTRAVENOUS at 07:06

## 2019-06-18 RX ADMIN — OXYCODONE HYDROCHLORIDE 5 MG: 5 TABLET ORAL at 08:06

## 2019-06-18 RX ADMIN — LIDOCAINE HYDROCHLORIDE 80 MG: 20 INJECTION, SOLUTION INTRAVENOUS at 07:06

## 2019-06-18 RX ADMIN — LIDOCAINE HYDROCHLORIDE 1 MG: 10 INJECTION, SOLUTION EPIDURAL; INFILTRATION; INTRACAUDAL; PERINEURAL at 06:06

## 2019-06-18 RX ADMIN — FENTANYL CITRATE 100 MCG: 50 INJECTION, SOLUTION INTRAMUSCULAR; INTRAVENOUS at 07:06

## 2019-06-18 RX ADMIN — FENTANYL CITRATE 25 MCG: 50 INJECTION, SOLUTION INTRAMUSCULAR; INTRAVENOUS at 08:06

## 2019-06-18 RX ADMIN — Medication 30 MG: at 07:06

## 2019-06-18 RX ADMIN — SODIUM CHLORIDE: 0.9 INJECTION, SOLUTION INTRAVENOUS at 06:06

## 2019-06-18 RX ADMIN — DEXAMETHASONE SODIUM PHOSPHATE 8 MG: 4 INJECTION, SOLUTION INTRAMUSCULAR; INTRAVENOUS at 07:06

## 2019-06-18 RX ADMIN — FENTANYL CITRATE 50 MCG: 50 INJECTION, SOLUTION INTRAMUSCULAR; INTRAVENOUS at 08:06

## 2019-06-18 RX ADMIN — PROPOFOL 150 MG: 10 INJECTION, EMULSION INTRAVENOUS at 07:06

## 2019-06-18 RX ADMIN — HYDROMORPHONE HYDROCHLORIDE 1 MG: 2 INJECTION, SOLUTION INTRAMUSCULAR; INTRAVENOUS; SUBCUTANEOUS at 08:06

## 2019-06-18 RX ADMIN — ONDANSETRON 4 MG: 2 INJECTION INTRAMUSCULAR; INTRAVENOUS at 07:06

## 2019-06-18 RX ADMIN — ACETAMINOPHEN 1000 MG: 10 INJECTION, SOLUTION INTRAVENOUS at 07:06

## 2019-06-18 RX ADMIN — CLINDAMYCIN IN 5 PERCENT DEXTROSE 900 MG: 18 INJECTION, SOLUTION INTRAVENOUS at 07:06

## 2019-06-18 NOTE — ANESTHESIA PREPROCEDURE EVALUATION
[facility]  Anesthesia Pre-Operative Evaluation         Patient Name: Dexter Robertson  YOB: 1955  MRN: [id number]    SUBJECTIVE:     Pre-operative evaluation for Procedure(s) (LRB):  REPAIR, HERNIA, INGUINAL, WITHOUT HISTORY OF PRIOR REPAIR, AGE 5 YEARS OR OLDER Open Right With or Without Mesh (Right)     06/17/2019    Dexter Robertson is a 63 y.o. male w/ a significant PMHx of Alcoholic cirrhosis, ascites, and hyperlipidemia. 21.5 packyear smoking history    Patient now presents for the above procedure(s).      LDA: None documented.    Prev airway:     Tips Procedure - Glidescope; Inserted by: CRNA; Airway Device: Endotracheal Tube; Mask Ventilation: Easy - oral; Intubated: Postinduction; Airway Device Size: 8.0  Complications: None; Breath Sounds: Equal Bilateral; Insertion Attempts: 1     Drips: None documented.    Patient Active Problem List   Diagnosis    Colovesical fistula    Alcoholic cirrhosis of liver with ascites    Peptic ulcer disease    Tobacco use disorder    Ascites of liver    Alcohol abuse, in remission    Anxiety and depression    Lower urinary tract symptoms (LUTS)    Mixed hyperlipidemia       Review of patient's allergies indicates:   Allergen Reactions    Bee sting [allergen ext-venom-honey bee] Anaphylaxis    Ativan [lorazepam] Other (See Comments)     confusion    Iodine and iodide containing products Edema    Pcn [penicillins] Edema       Current Outpatient Medications:  No current facility-administered medications for this encounter.     Current Outpatient Medications:     acetaminophen (TYLENOL) 325 MG tablet, Take 325 mg by mouth every 6 (six) hours as needed for Pain., Disp: , Rfl:     buPROPion (WELLBUTRIN SR) 150 MG TBSR 12 hr tablet, Take 1 tablet (150 mg total) by mouth 2 (two) times daily., Disp: 180 tablet, Rfl: 3    citalopram (CELEXA) 20 MG tablet, Take 1 tablet (20 mg total) by mouth once daily., Disp: 90 tablet,  Rfl: 3    furosemide (LASIX) 40 MG tablet, TAKE 1 & 1/2 TABLETS BY MOUTH ONCE DAILY, Disp: 45 tablet, Rfl: 2    omega-3 fatty acids-vitamin E (FISH OIL) 1,000 mg Cap, Take 1 capsule by mouth 2 (two) times daily., Disp: , Rfl: 0    pantoprazole (PROTONIX) 40 MG tablet, Take 1 tablet (40 mg total) by mouth once daily., Disp: 90 tablet, Rfl: 3    spironolactone (ALDACTONE) 100 MG tablet, TAKE 1 & 1/2 TABLETS BY MOUTH ONCE DAILY, Disp: 45 tablet, Rfl: 2    nystatin (MYCOSTATIN) cream, Apply topically 3 (three) times daily., Disp: 60 g, Rfl: 0    Past Surgical History:   Procedure Laterality Date    HERNIA REPAIR      TIPS N/A 8/9/2016    Per[facility] Surgeon [facility]    TONSILLECTOMY         Social History     Socioeconomic History    Marital status:      Spouse name: Not on file    Number of children: Not on file    Years of education: Not on file    Highest education level: Not on file   Occupational History    Not on file   Social Needs    Financial resource strain: Not on file    Food insecurity:     Worry: Not on file     Inability: Not on file    Transportation needs:     Medical: Not on file     Non-medical: Not on file   Tobacco Use    Smoking status: Current Every Day Smoker     Packs/day: 0.50     Years: 43.00     Pack years: 21.50     Types: Cigarettes    Smokeless tobacco: Never Used   Substance and Sexual Activity    Alcohol use: No    Drug use: No    Sexual activity: Not on file   Lifestyle    Physical activity:     Days per week: Not on file     Minutes per session: Not on file    Stress: Not on file   Relationships    Social connections:     Talks on phone: Not on file     Gets together: Not on file     Attends Christian service: Not on file     Active member of club or organization: Not on file     Attends meetings of clubs or organizations: Not on file     Relationship status: Not on file   Other Topics Concern    Not on file   Social History Narrative    Not  on file       OBJECTIVE:     Vital Signs Range (Last 24H):         Significant Labs:  Lab Results   Component Value Date    WBC 11.31 04/09/2019    HGB 15.1 04/09/2019    HCT 45.4 04/09/2019     (H) 04/09/2019    CHOL 254 (H) 06/04/2018    TRIG 372 (H) 06/04/2018    HDL 45 06/04/2018    ALT 24 04/09/2019    AST 31 04/09/2019     04/09/2019    K 4.1 04/09/2019    CL 97 04/09/2019    CREATININE 1.37 04/09/2019    BUN 29 (H) 04/09/2019    CO2 29 04/09/2019    PSA 1.2 10/18/2016    INR 1.0 04/09/2019       Diagnostic Studies: No relevant studies.    EKG: No recent studies available.    2D ECHO:  Results for orders placed or performed during the hospital encounter of 06/08/16   2D echo with color flow doppler   Result Value Ref Range    QEF 55 55 - 65    Diastolic Dysfunction No     Est. PA Systolic Pressure 27     Mitral Valve Mobility NORMAL          ASSESSMENT/PLAN:                                                                                                                06/17/2019  Dexter Robertson is a 63 y.o., male.    Anesthesia Evaluation    I have reviewed the Patient Summary Reports.    I have reviewed the Nursing Notes.   I have reviewed the Medications.     Review of Systems  Anesthesia Hx:  No problems with previous Anesthesia  History of prior surgery of interest to airway management or planning: (tips) Denies Family Hx of Anesthesia complications.   Denies Personal Hx of Anesthesia complications.   Social:  Smoker    Hematology/Oncology:  Hematology Normal   Oncology Normal     EENT/Dental:EENT/Dental Normal   Cardiovascular:   hyperlipidemia    Pulmonary:  Pulmonary Normal    Hepatic/GI:   Inguinal hernia   Neurological:  Neurology Normal    Endocrine:  Endocrine Normal    Psych:   anxiety depression          Physical Exam  General:  Well nourished    Airway/Jaw/Neck:  Airway Findings: Mouth Opening: Normal Tongue: Normal  General Airway Assessment: Adult  Mallampati: II  Improves to  I with phonation.  TM Distance: Normal, at least 6 cm  Jaw/Neck Findings:  Neck ROM: Normal ROM      Dental:  Dental Findings: In tact   Chest/Lungs:  Chest/Lungs Findings: Normal Respiratory Rate     Heart/Vascular:  Heart Findings: Rate: Normal  Rhythm: Regular Rhythm        Mental Status:  Mental Status Findings:  Cooperative, Alert and Oriented         Anesthesia Plan  Type of Anesthesia, risks & benefits discussed:  Anesthesia Type:  general  Patient's Preference:   Intra-op Monitoring Plan: standard ASA monitors  Intra-op Monitoring Plan Comments:   Post Op Pain Control Plan: multimodal analgesia  Post Op Pain Control Plan Comments:   Induction:   IV  Beta Blocker:  Patient is not currently on a Beta-Blocker (No further documentation required).       Informed Consent: Patient understands risks and agrees with Anesthesia plan.  Questions answered. Anesthesia consent signed with patient.  ASA Score: 2     Day of Surgery Review of History & Physical:    H&P update referred to the provider.         Ready For Surgery From Anesthesia Perspective.

## 2019-06-18 NOTE — BRIEF OP NOTE
[facility]  Brief Operative Note     SUMMARY     Surgery Date: 6/18/2019     Surgeon(s) and Role:     * Humberto Frias MD - Primary     * Kenton Mckinney MD - Resident - Assisting      Pre-op Diagnosis:  Non-recurrent inguinal hernia of right side [K40.90]    Post-op Diagnosis:  Post-Op Diagnosis Codes:     * Non-recurrent inguinal hernia of right side [K40.90]    Procedure(s) (LRB):  REPAIR, HERNIA, INGUINAL, WITHOUT HISTORY OF PRIOR REPAIR, AGE 5 YEARS OR OLDER Open Right With or Without Mesh (Right)    Anesthesia: General    Description of the findings of the procedure: Right indirect inguinal hernia repaired primarily     Findings/Key Components: Right indirect inguinal hernia repaired primarily.     Estimated Blood Loss: * No values recorded between 6/18/2019  7:21 AM and 6/18/2019  8:17 AM *         Specimens:   Specimen (12h ago, onward)    Start     Ordered    06/18/19 0736  Specimen to Pathology - Surgery  Once     Comments:  1. Hernia Sac - Permanent     Start Status     06/18/19 0736 Collected (06/18/19 0752) Order ID: [id number]       06/18/19 0735          Discharge Note    SUMMARY     Admit Date: 6/18/2019    Discharge Date and Time:  06/18/2019 8:24 AM    Hospital Course (synopsis of major diagnoses, care, treatment, and services provided during the course of the hospital stay): The patient underwent a successful outpatient procedure and was deemed safe for discharge home the same day.     Final Diagnosis: Post-Op Diagnosis Codes:     * Non-recurrent inguinal hernia of right side [K40.90]    Disposition: Home or Self Care    Follow Up/Patient Instructions:     Medications:  Reconciled Home Medications:      Medication List      START taking these medications    oxyCODONE 5 MG immediate release tablet  Commonly known as:  ROXICODONE  Take 1 tablet (5 mg total) by mouth every 4 to 6 hours as needed for Pain.        CONTINUE taking these medications    acetaminophen 325 MG  tablet  Commonly known as:  TYLENOL  Take 325 mg by mouth every 6 (six) hours as needed for Pain.     buPROPion 150 MG TBSR 12 hr tablet  Commonly known as:  WELLBUTRIN SR  Take 1 tablet (150 mg total) by mouth 2 (two) times daily.     citalopram 20 MG tablet  Commonly known as:  CELEXA  Take 1 tablet (20 mg total) by mouth once daily.     furosemide 40 MG tablet  Commonly known as:  LASIX  TAKE 1 & 1/2 TABLETS BY MOUTH ONCE DAILY     nystatin cream  Commonly known as:  MYCOSTATIN  Apply topically 3 (three) times daily.     omega-3 fatty acids-vitamin E 1,000 mg Cap  Commonly known as:  FISH OIL  Take 1 capsule by mouth 2 (two) times daily.     pantoprazole 40 MG tablet  Commonly known as:  PROTONIX  Take 1 tablet (40 mg total) by mouth once daily.     spironolactone 100 MG tablet  Commonly known as:  ALDACTONE  TAKE 1 & 1/2 TABLETS BY MOUTH ONCE DAILY          Discharge Procedure Orders   Notify your health care provider if you experience any of the following:  temperature >100.4     Notify your health care provider if you experience any of the following:  persistent nausea and vomiting or diarrhea     Notify your health care provider if you experience any of the following:  severe uncontrolled pain     Notify your health care provider if you experience any of the following:  redness, tenderness, or signs of infection (pain, swelling, redness, odor or green/yellow discharge around incision site)     Notify your health care provider if you experience any of the following:  difficulty breathing or increased cough     Notify your health care provider if you experience any of the following:  severe persistent headache     Notify your health care provider if you experience any of the following:  worsening rash     Notify your health care provider if you experience any of the following:  persistent dizziness, light-headedness, or visual disturbances     Notify your health care provider if you experience any of the  following:  increased confusion or weakness     Remove dressing in 48 hours   Scheduling Instructions: May remove outer dressing in 48 hours. Underlying Steri Strips will remain in place for 7-10 days and fall off on their own. May shower in 48 hours with soap and water. Do not soak incision in standing water/tub/pool until skin incision is completely healed.     Activity as tolerated     Follow-up Information     Humberto Frias MD In 2 weeks.    Specialty:  General Surgery  Why:  For postoperative follow up.  Contact information:  [facility] [address]  [facility] [address] [phone]

## 2019-06-18 NOTE — DISCHARGE INSTRUCTIONS
Anesthesia: General Anesthesia     You are watched continuously during your procedure by your anesthesia provider.     Youre due to have surgery. During surgery, youll be given medicine called anesthesia or anesthetic. This will keep you comfortable and pain-free. Your anesthesia provider will use general anesthesia.  What is general anesthesia?  General anesthesia puts you into a state like deep sleep. It goes into the bloodstream (IV anesthetics), into the lungs (gas anesthetics), or both. You feel nothing during the procedure. You will not remember it. During the procedure, the anesthesia provider monitors you continuously. He or she checks your heart rate and rhythm, blood pressure, breathing, and blood oxygen.  · IV anesthetics. IV anesthetics are given through an IV line in your arm. Theyre often given first. This is so you are asleep before a gas anesthetic is started. Some kinds of IV anesthetics relieve pain. Others relax you. Your doctor will decide which kind is best in your case.  · Gas anesthetics. Gas anesthetics are breathed into the lungs. They are often used to keep you asleep. They can be given through a facemask or a tube placed in your larynx or trachea (breathing tube).  ¨ If you have a facemask, your anesthesia provider will most likely place it over your nose and mouth while youre still awake. Youll breathe oxygen through the mask as your IV anesthetic is started. Gas anesthetic may be added through the mask.  ¨ If you have a tube in the larynx or trachea, it will be inserted into your throat after youre asleep.  Anesthesia tools and medicines  You will likely have:  · IV anesthetics. These are put into an IV line into your bloodstream.  · Gas anesthetics. You breathe these anesthetics into your lungs, where they pass into your bloodstream.  · Pulse oximeter. This is a small clip that is attached to the end of your finger. This measures your blood oxygen level.  · Electrocardiography  leads (electrodes). These are small sticky pads that are placed on your chest. They record your heart rate and rhythm.  · Blood pressure cuff. This reads your blood pressure.  Risks and possible complications  General anesthesia has some risks. These include:  · Breathing problems  · Nausea and vomiting  · Sore throat or hoarseness (usually temporary)  · Allergic reaction to the anesthetic  · Irregular heartbeat (rare)  · Cardiac arrest (rare)   Anesthesia safety  · Follow all instructions you are given for how long not to eat or drink before your procedure.  · Be sure your doctor knows what medicines and drugs you take. This includes over-the-counter medicines, herbs, supplements, alcohol or other drugs. You will be asked when those were last taken.  · Have an adult family member or friend drive you home after the procedure.  · For the first 24 hours after your surgery:  ¨ Do not drive or use heavy equipment.  ¨ Do not make important decisions or sign legal documents. If important decisions or signing legal documents is necessary during the first 24 hours after surgery, have a trusted family member or spouse act on your behalf.  ¨ Avoid alcohol.  ¨ Have a responsible adult stay with you. He or she can watch for problems and help keep you safe.  Date Last Reviewed: 12/1/2016  © 1420-5005 [employer]. [address] [place] [address], [place] [address]. All rights reserved. This information is not intended as a substitute for professional medical care. Always follow your healthcare professional's instructions.        Discharge Instructions: After Your Surgery  Youve just had surgery. During surgery, you were given medicine called anesthesia to keep you relaxed and free of pain. After surgery, you may have some pain or nausea. This is common. Here are some tips for feeling better and getting well after surgery.     Stay on schedule with your medicine.   Going home  Your healthcare provider will show you how to  take care of yourself when you go home. He or she will also answer your questions. Have an adult family member or friend drive you home. For the first 24 hours after your surgery:  · Do not drive or use heavy equipment.  · Do not make important decisions or sign legal papers.  · Do not drink alcohol.  · Have someone stay with you, if needed. He or she can watch for problems and help keep you safe.  Be sure to go to all follow-up visits with your healthcare provider. And rest after your surgery for as long as your healthcare provider tells you to.  Coping with pain  If you have pain after surgery, pain medicine will help you feel better. Take it as told, before pain becomes severe. Also, ask your healthcare provider or pharmacist about other ways to control pain. This might be with heat, ice, or relaxation. And follow any other instructions your surgeon or nurse gives you.  Tips for taking pain medicine  To get the best relief possible, remember these points:  · Pain medicines can upset your stomach. Taking them with a little food may help.  · Most pain relievers taken by mouth need at least 20 to 30 minutes to start to work.  · Taking medicine on a schedule can help you remember to take it. Try to time your medicine so that you can take it before starting an activity. This might be before you get dressed, go for a walk, or sit down for dinner.  · Constipation is a common side effect of pain medicines. Call your healthcare provider before taking any medicines such as laxatives or stool softeners to help ease constipation. Also ask if you should skip any foods. Drinking lots of fluids and eating foods such as fruits and vegetables that are high in fiber can also help. Remember, do not take laxatives unless your surgeon has prescribed them.  · Drinking alcohol and taking pain medicine can cause dizziness and slow your breathing. It can even be deadly. Do not drink alcohol while taking pain medicine.  · Pain medicine  can make you react more slowly to things. Do not drive or run machinery while taking pain medicine.  Your healthcare provider may tell you to take acetaminophen to help ease your pain. Ask him or her how much you are supposed to take each day. Acetaminophen or other pain relievers may interact with your prescription medicines or other over-the-counter (OTC) medicines. Some prescription medicines have acetaminophen and other ingredients. Using both prescription and OTC acetaminophen for pain can cause you to overdose. Read the labels on your OTC medicines with care. This will help you to clearly know the list of ingredients, how much to take, and any warnings. It may also help you not take too much acetaminophen. If you have questions or do not understand the information, ask your pharmacist or healthcare provider to explain it to you before you take the OTC medicine.  Managing nausea  Some people have an upset stomach after surgery. This is often because of anesthesia, pain, or pain medicine, or the stress of surgery. These tips will help you handle nausea and eat healthy foods as you get better. If you were on a special food plan before surgery, ask your healthcare provider if you should follow it while you get better. These tips may help:  · Do not push yourself to eat. Your body will tell you when to eat and how much.  · Start off with clear liquids and soup. They are easier to digest.  · Next try semi-solid foods, such as mashed potatoes, applesauce, and gelatin, as you feel ready.  · Slowly move to solid foods. Dont eat fatty, rich, or spicy foods at first.  · Do not force yourself to have 3 large meals a day. Instead eat smaller amounts more often.  · Take pain medicines with a small amount of solid food, such as crackers or toast, to avoid nausea.     Call your surgeon if  · You still have pain an hour after taking medicine. The medicine may not be strong enough.  · You feel too sleepy, dizzy, or groggy.  The medicine may be too strong.  · You have side effects like nausea, vomiting, or skin changes, such as rash, itching, or hives.       If you have obstructive sleep apnea  You were given anesthesia medicine during surgery to keep you comfortable and free of pain. After surgery, you may have more apnea spells because of this medicine and other medicines you were given. The spells may last longer than usual.   At home:  · Keep using the continuous positive airway pressure (CPAP) device when you sleep. Unless your healthcare provider tells you not to, use it when you sleep, day or night. CPAP is a common device used to treat obstructive sleep apnea.  · Talk with your provider before taking any pain medicine, muscle relaxants, or sedatives. Your provider will tell you about the possible dangers of taking these medicines.  Date Last Reviewed: 12/1/2016 © 2000-2017 [employer]. [address] [place] [address]. All rights reserved. This information is not intended as a substitute for professional medical care. Always follow your healthcare professional's instructions.        After Laparoscopic Hernia Repair  You had a procedure called laparoscopic hernia repair. A hernia is a defect in the tough tissue covering the musculature of the abdominal wall (fascia). During laparoscopic hernia surgery, a surgeon inserts a telescope attached to a camera as well as surgical instruments through tiny incisions in your abdomen. The surgeon repairs the hernia with a mesh, which patches the tear or weakness in the fascia.  Home care  · Note that your shoulder may feel tight or your neck may be stiff for 24 to 48 hours after your surgery. This is common and usually lasts a short time. You may also have numbness around the incision area.  · Keep doing the coughing and deep breathing exercises that you learned in the hospital. These will help to prevent lung infection.  · Prevent constipation so you dont strain  when going to the bathroom. Eat fruits, vegetables, and whole grains. Drink 6 to 8 glasses of water a day, unless otherwise directed. Use a laxative or a mild stool softener if your healthcare provider says its OK.  · Wash your incision with mild soap and water. Pat it dry. Dont use oil, powder, or lotion on your incision.  · Shower or take baths as instructed by your healthcare provider. Instructions will vary based on how your incision was closed and how its healing. It may be closed with glue, sutures, or staples. Your healthcare provider may have different advice for each kind.  Activity  · Ask others to help with chores and errands while you recover.  · Dont lift anything heavier than 10 pounds until your healthcare provider says its OK.  · Dont mow the lawn, use a vacuum , or do other strenuous activities until your healthcare provider says it's OK.  · Climb stairs slowly and pause after every few steps.  · Walk as often as you feel able.  · Ask your healthcare provider when you can drive again. This may be when you stop taking pain medicine and can move comfortably from side to side. Dont drive if you are still taking opioid pain medicine.  When to call your healthcare provider   Call your healthcare provider right away if you have any of the following:  · Pain, bleeding, redness, or fluid at the incision site that gets worse  · Fever of 100.4°F (38°C) or higher, or as directed by your healthcare provider  · Vomiting or nausea that doesnt go away  · Inability to urinate  · No bowel movement after 3 days  · Swelling in abdomen or groin that gets worse  · Pain thats not relieved by medicine   Date Last Reviewed: 10/1/2016  © 0054-4638 [employer]. [address] [place] [address], [place] [address]. All rights reserved. This information is not intended as a substitute for professional medical care. Always follow your healthcare professional's instructions.

## 2019-06-18 NOTE — PLAN OF CARE
Pt meets criteria for discharge. AAO, tolerating po liquids. No s/s of pain. IV removed tip intact; Discharge instructions given and reviewed.     Family to get the car. Plan to dc home with spouse and sister.

## 2019-06-18 NOTE — OP NOTE
DATE OF PROCEDURE: 06/18/2019     PREOPERATIVE DIAGNOSIS: Right inguinal hernia.     POSTOPERATIVE DIAGNOSIS: Right inguinal hernia.     PROCEDURE PERFORMED: Right inguinal hernia repaired primarily.     ATTENDING SURGEON: Humberto Frias M.D.     HOUSESTAFF SURGEON: Kenton Mckinney M.D. (RES)      ANESTHESIA: Monitored anesthesia care plus local.     ESTIMATED BLOOD LOSS: 10 mL.     FINDINGS: Large-sized right indirect inguinal hernia repaired with Prolene suture.    SPECIMEN: Hernia sac     DRAINS: None.     COMPLICATIONS: None.     INDICATIONS: Dexter Robertson is a 63 y.o.male referred to the General Surgery Clinic with a history of a symptomatic, reducible inguinal bulge. The history and exam were consistent with inguinal hernia. We recommended an open inguinal hernia repair and the patient agreed to proceed. The patient signed informed consent and expressed understanding of the risks and benefits of surgery.     DESCRIPTION OF OPERATIVE PROCEDURE: The patient was identified in preoperative holding and brought back to the Operating Room. The patient was placed supine on the operating table and padded appropriately. Monitors were applied and monitored anesthesia care was initiated. His right groin was shaved with electric clippers and prepped and draped in the standard sterile surgical fashion. A timeout was performed and all team members present agreed this was the correct procedure on the correct side of the correct patient. We also confirmed administration of appropriate preoperative antibiotics.     We marked out an appropriate right inguinal incision and administered a mix of lidocaine and Marcaine. After assuring adequate local anesthesia, a 5 cm transverse skin incision was made. Subcutaneous tissue was divided with Bovie electrocautery. This dissection was carried down through Ramses fascia down to the aponeurosis of the external oblique. The aponeurosis of the external oblique was incised in line  with its fibers, first with a scalpel and then Metzenbaum scissors, and this incision was extended to the external spermatic ring. The inguinal canal contents were bluntly encircled, first digitally and then with a Penrose drain. Maravilla retractors were used to facilitate the dissection. We proceeded to identify an indirect inguinal hernia sac, which was carefully dissected away from the inguinal canal contents until it could be reduced into the defect. We performed skeletonization of the spermatic cord. We then had appropriate borders of the inguinal canal identified and, given his medical history, decided to repair the defect primarily with four interrupted 0 Prolene sutures joining the conjoint tendon to the Shelving edge of the inguinal ligament. Prior to this, a relaxing incision was made in the conjoint tendon, allowing for a tension-free repair. The aponeurosis of the external oblique was closed with a running 3-0 Vicryl suture. Ramses fascia was closed with several buried interrupted 3-0 Vicryl sutures and the skin was closed with a running subcuticular Monocryl suture. A sterile dressing was applied. The patient was then transported to the Recovery Room in stable condition. All sponge, instrument and needle counts were correct at the end of the case. Dr. Frias was present and scrubbed for the entire procedure.

## 2019-06-18 NOTE — TRANSFER OF CARE
"Anesthesia Transfer of Care Note    Patient: Dexter Robertson    Procedure(s) Performed: Procedure(s) (LRB):  REPAIR, HERNIA, INGUINAL, WITHOUT HISTORY OF PRIOR REPAIR, AGE 5 YEARS OR OLDER Open Right With or Without Mesh (Right)    Patient location: PACU    Anesthesia Type: general    Transport from OR: Transported from OR on 6-10 L/min O2 by face mask with adequate spontaneous ventilation    Post pain: adequate analgesia    Post assessment: no apparent anesthetic complications    Post vital signs: stable    Level of consciousness: awake, responds to stimulation and agitated    Nausea/Vomiting: no nausea/vomiting    Complications: none    Transfer of care protocol was followed      Last vitals:   Visit Vitals  /73   Pulse 83   Temp 36.5 °C (97.7 °F) (Oral)   Resp 18   Ht 5' 10" (1.778 m)   Wt 83.9 kg (185 lb)   SpO2 100%   BMI 26.54 kg/m²     "

## 2019-06-18 NOTE — ANESTHESIA POSTPROCEDURE EVALUATION
Anesthesia Post Evaluation    Patient: Dexter Robertson    Procedure(s) Performed: Procedure(s) (LRB):  REPAIR, HERNIA, INGUINAL, WITHOUT HISTORY OF PRIOR REPAIR, AGE 5 YEARS OR OLDER Open Right With or Without Mesh (Right)    Final Anesthesia Type: general  Patient location during evaluation: PACU  Patient participation: Yes- Able to Participate  Level of consciousness: awake and alert  Post-procedure vital signs: reviewed and stable  Pain management: adequate  Airway patency: patent  PONV status at discharge: No PONV  Anesthetic complications: no      Cardiovascular status: hemodynamically stable  Respiratory status: unassisted  Hydration status: euvolemic  Follow-up not needed.          Vitals Value Taken Time   /70 6/18/2019 10:16 AM   Temp 36.7 °C (98 °F) 6/18/2019 10:09 AM   Pulse 91 6/18/2019 10:17 AM   Resp 14 6/18/2019 10:09 AM   SpO2 95 % 6/18/2019 10:17 AM   Vitals shown include unvalidated device data.      Event Time     Out of Recovery 09:38:55          Pain/Radha Score: Pain Rating Prior to Med Admin: 6 (6/18/2019  8:50 AM)  Radha Score: 9 (6/18/2019  9:35 AM)

## 2019-06-19 VITALS
HEIGHT: 70 IN | TEMPERATURE: 98 F | BODY MASS INDEX: 26.48 KG/M2 | DIASTOLIC BLOOD PRESSURE: 76 MMHG | HEART RATE: 92 BPM | SYSTOLIC BLOOD PRESSURE: 138 MMHG | RESPIRATION RATE: 14 BRPM | OXYGEN SATURATION: 98 % | WEIGHT: 185 LBS

## 2019-06-19 RX ORDER — HYDROMORPHONE HYDROCHLORIDE 2 MG/ML
INJECTION, SOLUTION INTRAMUSCULAR; INTRAVENOUS; SUBCUTANEOUS
Status: DISCONTINUED | OUTPATIENT
Start: 2019-06-18 | End: 2019-06-19

## 2019-06-21 ENCOUNTER — TELEPHONE (OUTPATIENT)
Dept: SURGERY | Facility: CLINIC | Age: 64
End: 2019-06-21

## 2019-06-21 NOTE — TELEPHONE ENCOUNTER
----- Message from Larry Clifton sent at 6/21/2019 12:21 PM CDT -----  Contact: pt  Needs Advice    Reason for call: pt states he is struggling to use the restroom. Pt states he has taken stool softeners and it is not working. Pt wants to speak with nurse for advice on what he should do.         Communication Preference: [phone]     Additional Information: please contact pt regarding this matter

## 2019-06-26 ENCOUNTER — TELEPHONE (OUTPATIENT)
Dept: SURGERY | Facility: CLINIC | Age: 64
End: 2019-06-26

## 2019-06-26 NOTE — TELEPHONE ENCOUNTER
----- Message from Chago Dos Santos sent at 6/26/2019  4:42 PM CDT -----  Contact: Pt   Needs Advice    Reason for call:The Pt would like to speak with Vanessa        Communication Preference:[phone]    Additional Information:

## 2019-06-26 NOTE — TELEPHONE ENCOUNTER
Left message on patient's voicemail as a follow-up to his inguinal hernia repair with Dr. Frias on 6/18/19.  Phone number given for him to call back with any questions or concerns.

## 2019-06-26 NOTE — TELEPHONE ENCOUNTER
Left message on patient's voicemail that call was returned and for him to call back.  Phone number given for him to call back.

## 2019-06-27 ENCOUNTER — TELEPHONE (OUTPATIENT)
Dept: SURGERY | Facility: CLINIC | Age: 64
End: 2019-06-27

## 2019-06-27 NOTE — TELEPHONE ENCOUNTER
----- Message from Chago Dos Santos sent at 6/27/2019 10:51 AM CDT -----  Contact: Pt  Needs Advice    Reason for call:The caller states that he needs a call back from Vanessa and is having trouble with the battery on his phone and would appreciate a call back from Vanessa until she catches him.        Communication Preference:[phone]    Additional Information:

## 2019-06-27 NOTE — TELEPHONE ENCOUNTER
----- Message from Mayda Morrissey sent at 6/27/2019  9:50 AM CDT -----  Patient Returning Call from [facility]    Who Left Message for Patient:Vanessa  Communication Preference:[phone]  Additional Information:

## 2019-06-27 NOTE — TELEPHONE ENCOUNTER
Left message on patient's voicemail that call was returned.  Direct phone number given for him to call back with questions or concerns.  Also directed him to message this office through [facility] with any questions.  He is scheduled to f/u for his PO visit on 7/3/19.

## 2019-06-27 NOTE — TELEPHONE ENCOUNTER
Spoke with patient as a f/u to his inguinal hernia repair with Dr. Frias on 6/18/19.  He reports feeling well, has controlled pain, is ambulating without difficulty, eating and regular diet and his constipation has resolved.  Overall he feels better than last week.  He will f/u as scheduled on 7/3/19.

## 2019-07-03 ENCOUNTER — OFFICE VISIT (OUTPATIENT)
Dept: SURGERY | Facility: CLINIC | Age: 64
End: 2019-07-03
Payer: MEDICARE

## 2019-07-03 VITALS
WEIGHT: 195.44 LBS | HEIGHT: 70 IN | BODY MASS INDEX: 27.98 KG/M2 | TEMPERATURE: 98 F | DIASTOLIC BLOOD PRESSURE: 65 MMHG | HEART RATE: 87 BPM | SYSTOLIC BLOOD PRESSURE: 123 MMHG

## 2019-07-03 DIAGNOSIS — Z98.890 S/P RIGHT INGUINAL HERNIA REPAIR: Primary | ICD-10-CM

## 2019-07-03 DIAGNOSIS — Z87.19 S/P RIGHT INGUINAL HERNIA REPAIR: Primary | ICD-10-CM

## 2019-07-03 PROCEDURE — 99999 PR PBB SHADOW E&M-EST. PATIENT-LVL III: CPT | Mod: PBBFAC,,, | Performed by: SURGERY

## 2019-07-03 PROCEDURE — 99999 PR PBB SHADOW E&M-EST. PATIENT-LVL III: ICD-10-PCS | Mod: PBBFAC,,, | Performed by: SURGERY

## 2019-07-03 PROCEDURE — 99024 PR POST-OP FOLLOW-UP VISIT: ICD-10-PCS | Mod: S$GLB,,, | Performed by: SURGERY

## 2019-07-03 PROCEDURE — 99024 POSTOP FOLLOW-UP VISIT: CPT | Mod: S$GLB,,, | Performed by: SURGERY

## 2019-07-03 NOTE — PROGRESS NOTES
SUBJECTIVE:  The patient is a 63 y.o. y/o male 2 weeks s/p right inguinal hernia. He denies pain, fevers, chills, nausea, vomiting, diarrhea, or constipation. Eating well with normal appetite and bowel function. Denies redness around or drainage from incisions.    OBJECTIVE:  GEN: male in NAD  ABD: soft, non-tender, non-distended  INCISIONS: clean, dry and intact, healing well without signs of infection or hernia    ASSESSMENT/PLAN:  Doing well 2 weeks s/p right inguinal hernia. Patient is advised to avoid heavy lifting or strenuous activity for another 2-4 weeks. May resume light cardio. Patient may bathe and continue to take a regular diet. Will follow-up with me on an as-needed basis. All questions answered; patient is comfortable with follow-up plan.

## 2020-04-16 PROBLEM — K40.30 INCARCERATED LEFT INGUINAL HERNIA: Status: ACTIVE | Noted: 2020-04-16

## 2020-05-04 ENCOUNTER — TELEPHONE (OUTPATIENT)
Dept: SURGERY | Facility: CLINIC | Age: 65
End: 2020-05-04

## 2020-05-04 NOTE — TELEPHONE ENCOUNTER
----- Message from Dilcia Florez sent at 5/4/2020 11:45 AM CDT -----  Contact: Pt   Pt requesting to reschedule Procedure    Please call and advise    Phone [phone]

## 2024-08-07 PROBLEM — Z71.9 ENCOUNTER FOR HEALTH EDUCATION: Status: ACTIVE | Noted: 2024-08-07

## 2024-08-07 PROBLEM — E53.8 FOLATE DEFICIENCY: Status: ACTIVE | Noted: 2024-08-07

## 2024-08-07 PROBLEM — D75.89 MACROCYTOSIS: Status: ACTIVE | Noted: 2024-08-07

## 2024-08-07 PROBLEM — Z71.89 ENCOUNTER TO DISCUSS TREATMENT OPTIONS: Status: ACTIVE | Noted: 2024-08-07

## 2024-08-07 PROBLEM — Z87.891 FORMER SMOKER: Status: ACTIVE | Noted: 2024-08-07

## 2024-08-07 PROBLEM — D75.1 ERYTHROCYTOSIS: Status: ACTIVE | Noted: 2024-08-07

## 2024-08-07 PROBLEM — Z71.2 ENCOUNTER TO DISCUSS TEST RESULTS: Status: ACTIVE | Noted: 2024-08-07

## 2024-09-13 PROBLEM — R09.02 HYPOXIA: Status: ACTIVE | Noted: 2024-09-13

## 2024-09-13 PROBLEM — D75.1 SECONDARY ERYTHROCYTOSIS: Status: ACTIVE | Noted: 2024-09-13

## 2024-09-30 ENCOUNTER — TELEPHONE (OUTPATIENT)
Dept: HEPATOLOGY | Facility: CLINIC | Age: 69
End: 2024-09-30
Payer: COMMERCIAL

## 2024-09-30 NOTE — TELEPHONE ENCOUNTER
Reached out to pt in regards to scheduling. Attempted x2. Pt did not answer, left vm for pt to give the office a call back   ----- Message from Lorelei sent at 9/30/2024  9:59 AM CDT -----  Good morning. Dr. Torres has put in a referral for patient to be seen by hepatology, can we please contact patient with next available appointment please? Thank you!

## 2024-11-19 PROBLEM — F17.210 CIGARETTE NICOTINE DEPENDENCE WITHOUT COMPLICATION: Status: ACTIVE | Noted: 2024-11-19

## 2024-11-19 PROBLEM — R09.82 POST-NASAL DRIP: Status: ACTIVE | Noted: 2024-11-19

## 2024-11-19 PROBLEM — G47.10 HYPERSOMNIA: Status: ACTIVE | Noted: 2024-11-19

## 2024-12-10 ENCOUNTER — TELEPHONE (OUTPATIENT)
Dept: SMOKING CESSATION | Facility: CLINIC | Age: 69
End: 2024-12-10
Payer: MEDICARE

## 2024-12-10 NOTE — TELEPHONE ENCOUNTER
Contacted pt to reschedule missed intake appt; pt is currently out of state staying with family; will call if/when he returns to [place] to reschedule

## 2025-03-19 ENCOUNTER — OFFICE VISIT (OUTPATIENT)
Dept: HEPATOLOGY | Facility: CLINIC | Age: 70
End: 2025-03-19
Payer: MEDICARE

## 2025-03-19 VITALS
OXYGEN SATURATION: 98 % | RESPIRATION RATE: 18 BRPM | HEART RATE: 72 BPM | DIASTOLIC BLOOD PRESSURE: 70 MMHG | HEIGHT: 70 IN | SYSTOLIC BLOOD PRESSURE: 136 MMHG | BODY MASS INDEX: 25.05 KG/M2 | WEIGHT: 175 LBS

## 2025-03-19 DIAGNOSIS — F10.20 ALCOHOL USE DISORDER, SEVERE, DEPENDENCE: ICD-10-CM

## 2025-03-19 DIAGNOSIS — R63.4 UNINTENTIONAL WEIGHT LOSS: ICD-10-CM

## 2025-03-19 DIAGNOSIS — K74.60 DECOMPENSATED CIRRHOSIS: ICD-10-CM

## 2025-03-19 DIAGNOSIS — K72.90 DECOMPENSATED CIRRHOSIS: ICD-10-CM

## 2025-03-19 DIAGNOSIS — K70.31 ALCOHOLIC CIRRHOSIS OF LIVER WITH ASCITES: ICD-10-CM

## 2025-03-19 DIAGNOSIS — R63.0 ANOREXIA: ICD-10-CM

## 2025-03-19 DIAGNOSIS — Z98.890 HISTORY OF ABDOMINAL PARACENTESIS: ICD-10-CM

## 2025-03-19 PROCEDURE — 1100F PTFALLS ASSESS-DOCD GE2>/YR: CPT | Mod: CPTII,S$GLB,, | Performed by: INTERNAL MEDICINE

## 2025-03-19 PROCEDURE — 3078F DIAST BP <80 MM HG: CPT | Mod: CPTII,S$GLB,, | Performed by: INTERNAL MEDICINE

## 2025-03-19 PROCEDURE — 1159F MED LIST DOCD IN RCRD: CPT | Mod: CPTII,S$GLB,, | Performed by: INTERNAL MEDICINE

## 2025-03-19 PROCEDURE — 1125F AMNT PAIN NOTED PAIN PRSNT: CPT | Mod: CPTII,S$GLB,, | Performed by: INTERNAL MEDICINE

## 2025-03-19 PROCEDURE — 99204 OFFICE O/P NEW MOD 45 MIN: CPT | Mod: S$GLB,,, | Performed by: INTERNAL MEDICINE

## 2025-03-19 PROCEDURE — 99999 PR PBB SHADOW E&M-EST. PATIENT-LVL V: CPT | Mod: PBBFAC,,, | Performed by: INTERNAL MEDICINE

## 2025-03-19 PROCEDURE — 3288F FALL RISK ASSESSMENT DOCD: CPT | Mod: CPTII,S$GLB,, | Performed by: INTERNAL MEDICINE

## 2025-03-19 PROCEDURE — 1160F RVW MEDS BY RX/DR IN RCRD: CPT | Mod: CPTII,S$GLB,, | Performed by: INTERNAL MEDICINE

## 2025-03-19 PROCEDURE — 3075F SYST BP GE 130 - 139MM HG: CPT | Mod: CPTII,S$GLB,, | Performed by: INTERNAL MEDICINE

## 2025-03-19 PROCEDURE — 3008F BODY MASS INDEX DOCD: CPT | Mod: CPTII,S$GLB,, | Performed by: INTERNAL MEDICINE

## 2025-03-20 ENCOUNTER — TELEPHONE (OUTPATIENT)
Dept: ENDOSCOPY | Facility: HOSPITAL | Age: 70
End: 2025-03-20
Payer: MEDICARE

## 2025-03-20 DIAGNOSIS — K74.60 DECOMPENSATED CIRRHOSIS: Primary | ICD-10-CM

## 2025-03-20 DIAGNOSIS — K72.90 DECOMPENSATED CIRRHOSIS: Primary | ICD-10-CM

## 2025-03-20 NOTE — TELEPHONE ENCOUNTER
EGD Procedure Prep Instructions      Date of procedure: 4/14/25 Arrive at: 2:00PM Check in for labs at 1:30 PM on the 2nd floor      [facility] of [facility] [address] [facility] [place] [address]  Take the [facility] Elevators to 4th Floor Endoscopy Lab    How to prep:    Day Before Procedure: 4/13/25     You may have a light evening meal.   No solid food after 7:00 pm.   Continue drinking clear liquids.       Day of the Procedure:  4/14/25     You may have water/clear liquids until 4 hours before your procedure or as directed by the scheduling nurse  11:00AM. See below for list.    What You CANNOT do:   Do not drink milk or anything colored red.  Do not drink alcohol.  No gum chewing or candy morning of procedure.    Liquids That Are OK to Drink:   Water  Sports drinks (Gatorade, Power-Aid)  Coffee or tea (no cream or nondairy creamer)  Clear juices without pulp (apple, white grape)  Gelatin desserts (no fruit or toppings)  Clear soda (sprite, coke, ginger ale)  Chicken broth (until 12 midnight the night before procedure)      Comments:                 IMPORTANT INFORMATION TO KNOW BEFORE YOUR PROCEDURE    [facility] 4th Floor         If your procedure requires the administration of anesthesia, it is necessary for a responsible adult to drive you home. (Medical Transportation, Uber, Lyft, Taxi, etc. may ONLY be used if a responsible adult is present to accompany you home.  The responsible adult CAN'T be the  of the service).      person must be available to return to pick you up within 15 minutes of being notified of discharge.       Please bring a picture ID, insurance card, & copayment      Take Medications as directed below:      If you begin taking any blood thinning medications, injectable weight loss/diabetes medications (other than insulin) , or Adipex (Phentermine) please contact the endoscopy scheduling department listed below as soon as possible.    If you are  diabetic see the attached instruction sheet regarding your medication.     If you take HEART, BLOOD PRESSURE, SEIZURE, PAIN, LUNG (including inhalers/nebulizers), ANTI-REJECTION (transplant patients), or PSYCHIATRIC medications, please take at your regular times with a sip of water or as directed by the scheduling nurse.     Important contact information:    Endoscopy [phone] Hours of operation Monday-Friday 8:00-4:30pm.    Questions about insurance or financial obligations call [phone] or [phone].    If you have questions regarding the prep or need to reschedule, please call [phone]. After hours questions requiring immediate assistance, contact [facility] nurse line at [phone] or [phone].   NOTE:     On occasion, unforeseen circumstances may cause a delay in your procedure start time. We respect your time and appreciate your patience during these circumstances.      Comments:

## 2025-03-20 NOTE — TELEPHONE ENCOUNTER
Referral for procedure from  Workqueue referral (see Appts tab)      Spoke to patient to schedule procedure(s) Upper Endoscopy (EGD)       Physician to perform procedure(s) Dr. TARAH Sewell  Date of Procedure (s) 4/14/25  Arrival Time 2:00 PM  Time of Procedure(s) 3:00 PM   Location of Procedure(s) [place] 4th Floor  Type of Rx Prep sent to patient: N/A  Instructions provided to patient via Postal Mail    Patient was informed on the following information and verbalized understanding. Screening questionnaire reviewed with patient and complete. If procedure requires anesthesia, a responsible adult needs to be present to accompany the patient home, patient cannot drive after receiving anesthesia. Appointment details are tentative, especially check-in time. Patient will receive a prep-op call 7 days prior to confirm check-in time for procedure. If applicable the patient should contact their pharmacy to verify Rx for procedure prep is ready for pick-up. Patient was advised to call the scheduling department at [phone] if pharmacy states no Rx is available. Patient was advised to call the endoscopy scheduling department if any questions or concerns arise.      [facility] Scheduling Department

## 2025-03-31 ENCOUNTER — RESULTS FOLLOW-UP (OUTPATIENT)
Dept: TRANSPLANT | Facility: HOSPITAL | Age: 70
End: 2025-03-31

## 2025-04-14 ENCOUNTER — ANESTHESIA EVENT (OUTPATIENT)
Dept: ENDOSCOPY | Facility: HOSPITAL | Age: 70
End: 2025-04-14
Payer: MEDICARE

## 2025-04-14 ENCOUNTER — HOSPITAL ENCOUNTER (OUTPATIENT)
Facility: HOSPITAL | Age: 70
Discharge: HOME | End: 2025-04-14
Attending: INTERNAL MEDICINE
Payer: MEDICARE

## 2025-04-14 ENCOUNTER — ANESTHESIA (OUTPATIENT)
Dept: ENDOSCOPY | Facility: HOSPITAL | Age: 70
End: 2025-04-14
Payer: MEDICARE

## 2025-04-14 VITALS
DIASTOLIC BLOOD PRESSURE: 72 MMHG | WEIGHT: 165.81 LBS | RESPIRATION RATE: 17 BRPM | HEART RATE: 80 BPM | TEMPERATURE: 98 F | SYSTOLIC BLOOD PRESSURE: 133 MMHG | HEIGHT: 70 IN | BODY MASS INDEX: 23.74 KG/M2 | OXYGEN SATURATION: 99 %

## 2025-04-14 DIAGNOSIS — K76.6 PORTAL HYPERTENSION: ICD-10-CM

## 2025-04-14 DIAGNOSIS — K70.31 ALCOHOLIC CIRRHOSIS OF LIVER WITH ASCITES: Primary | ICD-10-CM

## 2025-04-14 PROCEDURE — 63600175 PHARM REV CODE 636 W HCPCS: Performed by: NURSE ANESTHETIST, CERTIFIED REGISTERED

## 2025-04-14 PROCEDURE — 37000008 HC ANESTHESIA 1ST 15 MINUTES: Performed by: INTERNAL MEDICINE

## 2025-04-14 PROCEDURE — 25000003 PHARM REV CODE 250: Performed by: NURSE ANESTHETIST, CERTIFIED REGISTERED

## 2025-04-14 PROCEDURE — 43235 EGD DIAGNOSTIC BRUSH WASH: CPT | Performed by: INTERNAL MEDICINE

## 2025-04-14 PROCEDURE — E9220 PRA ENDO ANESTHESIA: HCPCS | Mod: ,,, | Performed by: NURSE ANESTHETIST, CERTIFIED REGISTERED

## 2025-04-14 PROCEDURE — 43235 EGD DIAGNOSTIC BRUSH WASH: CPT | Mod: ,,, | Performed by: INTERNAL MEDICINE

## 2025-04-14 PROCEDURE — 37000009 HC ANESTHESIA EA ADD 15 MINS: Performed by: INTERNAL MEDICINE

## 2025-04-14 RX ORDER — SODIUM CHLORIDE 9 MG/ML
INJECTION, SOLUTION INTRAVENOUS CONTINUOUS PRN
Status: DISCONTINUED | OUTPATIENT
Start: 2025-04-14 | End: 2025-04-14

## 2025-04-14 RX ORDER — PROPOFOL 10 MG/ML
VIAL (ML) INTRAVENOUS CONTINUOUS PRN
Status: DISCONTINUED | OUTPATIENT
Start: 2025-04-14 | End: 2025-04-14

## 2025-04-14 RX ORDER — SODIUM CHLORIDE 9 MG/ML
INJECTION, SOLUTION INTRAVENOUS CONTINUOUS
Status: DISCONTINUED | OUTPATIENT
Start: 2025-04-14 | End: 2025-04-14 | Stop reason: HOSPADM

## 2025-04-14 RX ORDER — PROPOFOL 10 MG/ML
VIAL (ML) INTRAVENOUS
Status: DISCONTINUED | OUTPATIENT
Start: 2025-04-14 | End: 2025-04-14

## 2025-04-14 RX ORDER — LIDOCAINE HYDROCHLORIDE 20 MG/ML
INJECTION INTRAVENOUS
Status: DISCONTINUED | OUTPATIENT
Start: 2025-04-14 | End: 2025-04-14

## 2025-04-14 RX ADMIN — PROPOFOL 200 MCG/KG/MIN: 10 INJECTION, EMULSION INTRAVENOUS at 03:04

## 2025-04-14 RX ADMIN — LIDOCAINE HYDROCHLORIDE 80 MG: 20 INJECTION INTRAVENOUS at 03:04

## 2025-04-14 RX ADMIN — SODIUM CHLORIDE: 0.9 INJECTION, SOLUTION INTRAVENOUS at 02:04

## 2025-04-14 RX ADMIN — PROPOFOL 50 MG: 10 INJECTION, EMULSION INTRAVENOUS at 03:04

## 2025-04-14 NOTE — ANESTHESIA PREPROCEDURE EVALUATION
04/14/2025  Dexter Robertson is a 69 y.o., male.  Pre-operative evaluation for Procedure(s) (LRB):  EGD (ESOPHAGOGASTRODUODENOSCOPY) (N/A)    Dexter Robertson is a 69 y.o. male     Problem List[1]    Review of patient's allergies indicates:   Allergen Reactions    Bee sting [allergen ext-venom-honey bee] Anaphylaxis    Ativan [lorazepam] Other (See Comments)     confusion    Iodine and iodide containing products Edema    Pcn [penicillins] Edema    Iodine Other (See Comments)    Shellfish containing products        Medications Ordered Prior to Encounter[2]    Past Surgical History:   Procedure Laterality Date    COLONOSCOPY N/A 7/20/2020    Procedure: COLONOSCOPY;  Surgeon: Zack West MD;  [facility];  Service: General;  Laterality: N/A;    HERNIA REPAIR      LYSIS OF ADHESIONS N/A 8/19/2020    Procedure: LYSIS, ADHESIONS;  Surgeon: Zack West MD;  [facility];  Service: General;  Laterality: N/A;    SHOULDER SURGERY Right     SUBTOTAL COLECTOMY N/A 8/19/2020    Procedure: COLECTOMY, PARTIAL,  for colovesical fistula;  Surgeon: Zack West MD;  [facility];  Service: General;  Laterality: N/A;  covid negative 8-11      TONSILLECTOMY       BMP  Lab Results   Component Value Date     (L) 03/31/2025    K 4.4 03/31/2025    CL 98 03/31/2025    CO2 27 03/31/2025    BUN 7 (L) 03/31/2025    CREATININE 0.83 03/31/2025    CALCIUM 9.1 03/31/2025    ANIONGAP 10 03/31/2025    EGFRNORACEVR >60 03/31/2025     Lab Results   Component Value Date    WBC 7.20 03/31/2025    HGB 16.1 03/31/2025    HCT 46.4 03/31/2025     (H) 03/31/2025     03/31/2025 2022 ECHO  Final   1. The study quality is good.   2. Global left ventricular systolic function is mildly decreased. The   left ventricular ejection fraction is 45%.   3. Left ventricular diastolic  function is abnormal (stage I impaired   relaxation).    4. Mild aortic valve stenosis is present. Aortic valve area continuity   equation is 1.4 cm?. The trans-aortic peak gradient is 13 mmHg. The   trans-aortic mean gradient is 8 mmHg.    5. The right ventricle is normal in size. Right ventricular systolic   function is normal.    6. The pulmonary artery systolic pressure is 18 mmHg.       Pre-op Assessment    I have reviewed the Patient Summary Reports.     I have reviewed the Nursing Notes. I have reviewed the NPO Status.   I have reviewed the Medications.     Review of Systems  Anesthesia Hx:  No problems with previous Anesthesia                Cardiovascular:  Cardiovascular Normal                                              Hepatic/GI:     GERD   Alcoholic cirrhosis             Neurological:  Neurology Normal                                      Endocrine:  Denies Diabetes.               Physical Exam  General: Alert    Airway:  Mallampati: II   Mouth Opening: Normal  TM Distance: Normal  Tongue: Normal  Neck ROM: Normal ROM    Dental:  Periodontal disease      Anesthesia Plan  Type of Anesthesia, risks & benefits discussed:    Anesthesia Type: Gen Natural Airway  Intra-op Monitoring Plan: Standard ASA Monitors  Post Op Pain Control Plan: multimodal analgesia  Induction:  IV  ASA Score: 3  Day of Surgery Review of History & Physical: H&P Update referred to the surgeon/provider.    Ready For Surgery From Anesthesia Perspective.   .             [1]  Patient Active Problem List  Diagnosis    Colovesical fistula    Alcoholic cirrhosis of liver with ascites    Peptic ulcer disease    Personal history of tobacco use, presenting hazards to health    Ascites of liver    Alcohol use disorder    Anxiety and depression    Lower urinary tract symptoms (LUTS)    Mixed hyperlipidemia    Right inguinal hernia    Incarcerated left inguinal hernia    Bilateral lower extremity edema    Erythrocytosis     Encounter to discuss test results    Encounter to discuss treatment options    Encounter for health education    Folate deficiency    Macrocytosis    Former smoker    Secondary erythrocytosis    Hypoxia    Cigarette nicotine dependence without complication    Post-nasal drip    Hypersomnia   [2]  No current facility-administered medications on file prior to encounter.     Current Outpatient Medications on File Prior to Encounter   Medication Sig Dispense Refill    acetaminophen (TYLENOL) 325 MG tablet Take 325 mg by mouth every 6 (six) hours as needed for Pain.      aspirin 81 MG Chew Take 81 mg by mouth once daily.      buPROPion (WELLBUTRIN SR) 150 MG TBSR 12 hr tablet Take 1 tablet (150 mg total) by mouth 2 (two) times daily. 180 tablet 3    citalopram (CELEXA) 20 MG tablet Take 1 tablet (20 mg total) by mouth once daily. 90 tablet 3    co-enzyme Q-10 50 mg capsule Take 50 mg by mouth once daily. (Patient not taking: Reported on 3/19/2025)      docusate sodium (COLACE) 100 MG capsule Take 100 mg by mouth once daily.      fluticasone propionate (FLONASE) 50 mcg/actuation nasal spray 1 spray by Each Nostril route once daily. (Patient not taking: Reported on 3/19/2025)      folic acid (FOLVITE) 400 MCG tablet Take 400 mcg by mouth once daily.      furosemide (LASIX) 40 MG tablet TAKE 1 & 1/2 TABLETS BY MOUTH ONCE DAILY (Patient not taking: Reported on 3/19/2025) 45 tablet 2    gabapentin (NEURONTIN) 300 MG capsule Take 1 capsule (300 mg total) by mouth 3 (three) times daily. for 10 days 30 capsule 0    lisinopriL (PRINIVIL,ZESTRIL) 5 MG Tab Take 1 tablet (5 mg total) by mouth once daily. (Patient not taking: Reported on 3/19/2025) 30 tablet 1    loratadine (CLARITIN) 10 mg tablet Take 1 tablet (10 mg total) by mouth once daily. 30 tablet 11    lovastatin (MEVACOR) 40 MG tablet Take 40 mg by mouth every evening. (Patient not taking: Reported on 3/19/2025)      magnesium oxide (MAG-OX) 400 mg  (241.3 mg magnesium) tablet Take 400 mg by mouth every other day. (Patient not taking: Reported on 3/19/2025)      meloxicam (MOBIC) 7.5 MG tablet Take 7.5 mg by mouth once daily. (Patient not taking: Reported on 3/19/2025)      mirtazapine (REMERON SOL-TAB) 15 MG disintegrating tablet Take 15 mg by mouth every evening.      omega-3 fatty acids-vitamin E (FISH OIL) 1,000 mg Cap Take 1 capsule by mouth 2 (two) times daily.  0    pantoprazole (PROTONIX) 40 MG tablet TAKE 1 TABLET BY MOUTH EVERY DAY 30 tablet 0    potassium gluconate 595 mg (99 mg) TbSR Take 1 tablet by mouth once daily.      spironolactone (ALDACTONE) 100 MG tablet TAKE 1 & 1/2 TABLETS BY MOUTH ONCE DAILY (Patient not taking: Reported on 3/19/2025) 135 tablet 3    vitamin E 400 UNIT capsule Take 400 Units by mouth once daily.

## 2025-04-14 NOTE — ANESTHESIA POSTPROCEDURE EVALUATION
Anesthesia Post Evaluation    Patient: Dexter Robertson    Procedure(s) Performed: Procedure(s) (LRB):  EGD (ESOPHAGOGASTRODUODENOSCOPY) (N/A)    Final Anesthesia Type: general      Patient location during evaluation: GI PACU  Patient participation: Yes- Able to Participate  Level of consciousness: awake and alert and oriented  Post-procedure vital signs: reviewed and stable  Pain management: adequate  Airway patency: patent    PONV status at discharge: No PONV  Anesthetic complications: no      Cardiovascular status: stable  Respiratory status: unassisted, spontaneous ventilation and room air  Hydration status: euvolemic  Follow-up not needed.  Comments: Patient began coughing during EGD and desaturated. No contents visualized during EGD. Likely spasm.  Patient VSS with 97%O2sat on RA              Vitals Value Taken Time   /72 04/14/25 15:51   Temp 36.7 °C (98 °F) 04/14/25 15:36   Pulse 80 04/14/25 15:51   Resp 17 04/14/25 15:51   SpO2 99 % 04/14/25 15:51         No case tracking events are documented in the log.      Pain/Radha Score: Radha Score: 10 (4/14/2025  3:37 PM)

## 2025-04-14 NOTE — H&P
Short Stay Endoscopy History and Physical    PCP - Hina Temple MD     Procedure - EGD  ASA - per anesthesia  Mallampati - per anesthesia  History of Anesthesia problems - no  Family history Anesthesia problems -  no   Plan of anesthesia - per anesthesia    HPI:  This is a 69 y.o. male here for evaluation of: varices    Denies N/V, dysphagia, odynophagia, abd pain, diarrhea.    Denies current anticoagulation.    ROS:  Constitutional: No fevers, chills, No weight loss  CV: No chest pain  Pulm: No cough, No shortness of breath  Ophtho: No vision changes  GI: see HPI  Derm: No rash    Medical History:  has a past medical history of Arthritis, Asthma, Bleeding ulcer, Cirrhosis, Depression, Diverticulitis, Fistula, GERD without esophagitis, H/O seasonal allergies, High cholesterol, Hyperlipidemia, Irregular heart beat, JOYCE (obstructive sleep apnea), PVC (premature ventricular contraction), and Smokes cigarettes.    Surgical History:  has a past surgical history that includes Tonsillectomy; Hernia repair; Colonoscopy (N/A, 07/20/2020); Shoulder surgery (Right); Subtotal colectomy (N/A, 08/19/2020); Lysis of adhesions (N/A, 08/19/2020); and carpel tunnel surgery.    Family History: family history includes Diabetes in his mother; Heart disease in his father and mother.    Social History:  reports that he quit smoking about 5 years ago. His smoking use included cigarettes. He started smoking about 48 years ago. He has a 22.1 pack-year smoking history. He has never used smokeless tobacco. He reports current alcohol use. He reports current drug use. Drug: Marijuana.    Review of patient's allergies indicates:   Allergen Reactions    Bee sting [allergen ext-venom-honey bee] Anaphylaxis    Ativan [lorazepam] Other (See Comments)     confusion    Iodine and iodide containing products Edema    Pcn [penicillins] Edema    Iodine Other (See Comments)    Shellfish containing products        Medications:   Prescriptions Prior  to Admission[1]    Physical Exam:    Vital Signs:   Vitals:    04/14/25 1400   BP: 135/76   Pulse: 88   Resp: 14   Temp: 98.6 °F (37 °C)   Body mass index is 23.79 kg/m².    General Appearance: Well appearing in no acute distress  Eyes:    No scleral icterus  Lungs: Symmetric chest excursions  Heart:  Regular rate  Abdomen: Soft, non tender, non distended  Extremities: No edema  Skin: No rash    Labs:  Lab Results   Component Value Date    WBC 7.20 03/31/2025    HGB 16.1 03/31/2025    HCT 46.4 03/31/2025     03/31/2025    CHOL 254 (H) 06/04/2018    TRIG 372 (H) 06/04/2018    HDL 45 06/04/2018    ALT 44 03/31/2025     (H) 03/31/2025     (L) 03/31/2025    K 4.4 03/31/2025    CL 98 03/31/2025    CREATININE 0.83 03/31/2025    BUN 7 (L) 03/31/2025    CO2 27 03/31/2025    PSA 1.2 10/18/2016    INR 1.0 03/31/2025       We discussed the risks of EGD, including bleeding, infections, and perforation requiring surgery.    The patient was consented for the procedure. Plan to proceed with EGD. All questions were answered.    Kristy Sewell MD         [1]   Medications Prior to Admission   Medication Sig Dispense Refill Last Dose/Taking    aspirin 81 MG Chew Take 81 mg by mouth once daily.   Past Week    magnesium oxide (MAG-OX) 400 mg (241.3 mg magnesium) tablet Take 400 mg by mouth every other day.   Past Week    meloxicam (MOBIC) 7.5 MG tablet Take 7.5 mg by mouth once daily.   Past Week    pantoprazole (PROTONIX) 40 MG tablet TAKE 1 TABLET BY MOUTH EVERY DAY 30 tablet 0 4/13/2025    potassium gluconate 595 mg (99 mg) TbSR Take 1 tablet by mouth once daily.   Past Week    vitamin E 400 UNIT capsule Take 400 Units by mouth once daily.   Past Week    acetaminophen (TYLENOL) 325 MG tablet Take 325 mg by mouth every 6 (six) hours as needed for Pain.       buPROPion (WELLBUTRIN SR) 150 MG TBSR 12 hr tablet Take 1 tablet (150 mg total) by mouth 2 (two) times daily. 180 tablet 3     citalopram (CELEXA) 20 MG  tablet Take 1 tablet (20 mg total) by mouth once daily. 90 tablet 3     co-enzyme Q-10 50 mg capsule Take 50 mg by mouth once daily. (Patient not taking: Reported on 3/19/2025)       docusate sodium (COLACE) 100 MG capsule Take 100 mg by mouth once daily.       fluticasone propionate (FLONASE) 50 mcg/actuation nasal spray 1 spray by Each Nostril route once daily. (Patient not taking: Reported on 3/19/2025)       folic acid (FOLVITE) 400 MCG tablet Take 400 mcg by mouth once daily.       furosemide (LASIX) 40 MG tablet TAKE 1 & 1/2 TABLETS BY MOUTH ONCE DAILY (Patient not taking: Reported on 3/19/2025) 45 tablet 2 More than a month    gabapentin (NEURONTIN) 300 MG capsule Take 1 capsule (300 mg total) by mouth 3 (three) times daily. for 10 days 30 capsule 0     lisinopriL (PRINIVIL,ZESTRIL) 5 MG Tab Take 1 tablet (5 mg total) by mouth once daily. (Patient not taking: Reported on 11/19/2024) 30 tablet 1 More than a month    loratadine (CLARITIN) 10 mg tablet Take 1 tablet (10 mg total) by mouth once daily. 30 tablet 11     lovastatin (MEVACOR) 40 MG tablet Take 40 mg by mouth every evening. (Patient not taking: Reported on 11/19/2024)   More than a month    mirtazapine (REMERON SOL-TAB) 15 MG disintegrating tablet Take 15 mg by mouth every evening.       omega-3 fatty acids-vitamin E (FISH OIL) 1,000 mg Cap Take 1 capsule by mouth 2 (two) times daily.  0     spironolactone (ALDACTONE) 100 MG tablet TAKE 1 & 1/2 TABLETS BY MOUTH ONCE DAILY (Patient not taking: Reported on 3/19/2025) 135 tablet 3

## 2025-04-14 NOTE — PROVATION PATIENT INSTRUCTIONS
Discharge Summary/Instructions after an Endoscopic Procedure  Patient Name: Dexter Robertson  Patient MRN: [id number]  Patient YOB: 1955 Monday, April 14, 2025  Kristy Sewell MD  Dear patient,  As a result of recent federal legislation (The Federal Cures Act), you may   receive lab or pathology results from your procedure in your [facility]   account before your physician is able to contact you. Your physician or   their representative will relay the results to you with their   recommendations at their soonest availability.  Thank you,  RESTRICTIONS:  During your procedure today, you received medications for sedation.  These   medications may affect your judgment, balance and coordination.  Therefore,   for 24 hours, you have the following restrictions:   - DO NOT drive a car, operate machinery, make legal/financial decisions,   sign important papers or drink alcohol.    ACTIVITY:  Today: no heavy lifting, straining or running due to procedural   sedation/anesthesia.  The following day: return to full activity including work.  DIET:  Eat and drink normally unless instructed otherwise.     TREATMENT FOR COMMON SIDE EFFECTS:  - Mild abdominal pain, nausea, belching, bloating or excessive gas:  rest,   eat lightly and use a heating pad.  - Sore Throat: treat with throat lozenges and/or gargle with warm salt   water.  - Because air was used during the procedure, expelling large amounts of air   from your rectum or belching is normal.  - If a bowel prep was taken, you may not have a bowel movement for 1-3 days.    This is normal.  SYMPTOMS TO WATCH FOR AND REPORT TO YOUR PHYSICIAN:  1. Abdominal pain or bloating, other than gas cramps.  2. Chest pain.  3. Back pain.  4. Signs of infection such as: chills or fever occurring within 24 hours   after the procedure.  5. Rectal bleeding, which would show as bright red, maroon, or black stools.   (A tablespoon of blood from the rectum is not serious, especially if    hemorrhoids are present.)  6. Vomiting.  7. Weakness or dizziness.  GO DIRECTLY TO THE NEAREST EMERGENCY ROOM IF YOU HAVE ANY OF THE FOLLOWING:      Difficulty breathing              Chills and/or fever over 101 F   Persistent vomiting and/or vomiting blood   Severe abdominal pain   Severe chest pain   Black, tarry stools   Bleeding- more than one tablespoon   Any other symptom or condition that you feel may need urgent attention  Your doctor recommends these additional instructions:  If any biopsies were taken, your doctors clinic will contact you in 1 to 2   weeks with any results.  - Resume previous diet.   - Continue present medications.   - Discharge patient to home (with escort).   - The patient will be observed post-procedure, until all discharge criteria   are met.   - Patient has a contact number available for emergencies.  The signs and   symptoms of potential delayed complications were discussed with the   patient.  Return to normal activities tomorrow.  Written discharge   instructions were provided to the patient.  For questions, problems or results please call your physician - Kristy Sewell MD at Work:  [phone].  [facility], EMERGENCY ROOM PHONE NUMBER: [phone]  IF A COMPLICATION OR EMERGENCY SITUATION ARISES AND YOU ARE UNABLE TO REACH   YOUR PHYSICIAN - GO DIRECTLY TO THE EMERGENCY ROOM.  MD Kristy Blum MD  4/14/2025 3:27:01 PM  This report has been verified and signed electronically.  Dear patient,  As a result of recent federal legislation (The Federal Cures Act), you may   receive lab or pathology results from your procedure in your [facility]   account before your physician is able to contact you. Your physician or   their representative will relay the results to you with their   recommendations at their soonest availability.  Thank you,  PROVATION

## 2025-04-14 NOTE — TRANSFER OF CARE
"Anesthesia Transfer of Care Note    Patient: Dexter Robertson    Procedure(s) Performed: Procedure(s) (LRB):  EGD (ESOPHAGOGASTRODUODENOSCOPY) (N/A)    Patient location: GI    Anesthesia Type: general    Transport from OR: Transported from OR on 6-10 L/min O2 by face mask with adequate spontaneous ventilation    Post pain: adequate analgesia    Post assessment: no apparent anesthetic complications and tolerated procedure well    Post vital signs: stable    Level of consciousness: awake    Nausea/Vomiting: no nausea/vomiting    Complications: none    Transfer of care protocol was followed      Last vitals: Visit Vitals  /63   Pulse 87   Temp 36.7 °C (98 °F)   Resp 14   Ht 5' 10" (1.778 m)   Wt 75.2 kg (165 lb 12.6 oz)   SpO2 98%   BMI 23.79 kg/m²     "

## 2025-06-25 ENCOUNTER — OFFICE VISIT (OUTPATIENT)
Dept: HEPATOLOGY | Facility: CLINIC | Age: 70
End: 2025-06-25
Payer: MEDICARE

## 2025-06-25 ENCOUNTER — LAB VISIT (OUTPATIENT)
Dept: LAB | Facility: HOSPITAL | Age: 70
End: 2025-06-25
Attending: INTERNAL MEDICINE
Payer: MEDICARE

## 2025-06-25 VITALS
HEART RATE: 71 BPM | OXYGEN SATURATION: 98 % | HEIGHT: 70 IN | WEIGHT: 175.06 LBS | BODY MASS INDEX: 25.06 KG/M2 | DIASTOLIC BLOOD PRESSURE: 77 MMHG | RESPIRATION RATE: 18 BRPM | SYSTOLIC BLOOD PRESSURE: 183 MMHG

## 2025-06-25 DIAGNOSIS — K70.31 ALCOHOLIC CIRRHOSIS OF LIVER WITH ASCITES: ICD-10-CM

## 2025-06-25 DIAGNOSIS — F10.29 ALCOHOL DEPENDENCE WITH UNSPECIFIED ALCOHOL-INDUCED DISORDER: ICD-10-CM

## 2025-06-25 DIAGNOSIS — Z98.890 HISTORY OF ABDOMINAL PARACENTESIS: ICD-10-CM

## 2025-06-25 DIAGNOSIS — K72.90 DECOMPENSATED CIRRHOSIS: ICD-10-CM

## 2025-06-25 DIAGNOSIS — K74.60 DECOMPENSATED CIRRHOSIS: ICD-10-CM

## 2025-06-25 DIAGNOSIS — F10.90 ALCOHOL USE DISORDER: ICD-10-CM

## 2025-06-25 LAB
ABSOLUTE EOSINOPHIL (OHS): 0.27 K/UL
ABSOLUTE EOSINOPHIL (OHS): 0.3 K/UL
ABSOLUTE MONOCYTE (OHS): 0.42 K/UL (ref 0.3–1)
ABSOLUTE MONOCYTE (OHS): 0.47 K/UL (ref 0.3–1)
ABSOLUTE NEUTROPHIL COUNT (OHS): 4.75 K/UL (ref 1.8–7.7)
ABSOLUTE NEUTROPHIL COUNT (OHS): 4.77 K/UL (ref 1.8–7.7)
ALBUMIN SERPL BCP-MCNC: 4.3 G/DL (ref 3.5–5.2)
ALP SERPL-CCNC: 234 UNIT/L (ref 40–150)
ALT SERPL W/O P-5'-P-CCNC: 38 UNIT/L (ref 10–44)
ANION GAP (OHS): 11 MMOL/L (ref 8–16)
AST SERPL-CCNC: 72 UNIT/L (ref 11–45)
BASOPHILS # BLD AUTO: 0.08 K/UL
BASOPHILS # BLD AUTO: 0.1 K/UL
BASOPHILS NFR BLD AUTO: 1.2 %
BASOPHILS NFR BLD AUTO: 1.5 %
BILIRUB DIRECT SERPL-MCNC: 0.3 MG/DL (ref 0.1–0.3)
BILIRUB SERPL-MCNC: 0.6 MG/DL (ref 0.1–1)
BUN SERPL-MCNC: 9 MG/DL (ref 8–23)
CALCIUM SERPL-MCNC: 9.7 MG/DL (ref 8.7–10.5)
CHLORIDE SERPL-SCNC: 101 MMOL/L (ref 95–110)
CO2 SERPL-SCNC: 25 MMOL/L (ref 23–29)
CREAT SERPL-MCNC: 1.1 MG/DL (ref 0.5–1.4)
ERYTHROCYTE [DISTWIDTH] IN BLOOD BY AUTOMATED COUNT: 12.4 % (ref 11.5–14.5)
ERYTHROCYTE [DISTWIDTH] IN BLOOD BY AUTOMATED COUNT: 12.8 % (ref 11.5–14.5)
GFR SERPLBLD CREATININE-BSD FMLA CKD-EPI: >60 ML/MIN/1.73/M2
GLUCOSE SERPL-MCNC: 87 MG/DL (ref 70–110)
HCT VFR BLD AUTO: 43.9 % (ref 40–54)
HCT VFR BLD AUTO: 44.2 % (ref 40–54)
HGB BLD-MCNC: 14.8 GM/DL (ref 14–18)
HGB BLD-MCNC: 15 GM/DL (ref 14–18)
IMM GRANULOCYTES # BLD AUTO: 0.02 K/UL (ref 0–0.04)
IMM GRANULOCYTES # BLD AUTO: 0.03 K/UL (ref 0–0.04)
IMM GRANULOCYTES NFR BLD AUTO: 0.3 % (ref 0–0.5)
IMM GRANULOCYTES NFR BLD AUTO: 0.4 % (ref 0–0.5)
INR PPP: 1 (ref 0.8–1.2)
INR PPP: 1 (ref 0.8–1.2)
LYMPHOCYTES # BLD AUTO: 1.03 K/UL (ref 1–4.8)
LYMPHOCYTES # BLD AUTO: 1.06 K/UL (ref 1–4.8)
MCH RBC QN AUTO: 35.4 PG (ref 27–31)
MCH RBC QN AUTO: 35.5 PG (ref 27–31)
MCHC RBC AUTO-ENTMCNC: 33.7 G/DL (ref 32–36)
MCHC RBC AUTO-ENTMCNC: 33.9 G/DL (ref 32–36)
MCV RBC AUTO: 105 FL (ref 82–98)
MCV RBC AUTO: 105 FL (ref 82–98)
NUCLEATED RBC (/100WBC) (OHS): 0 /100 WBC
NUCLEATED RBC (/100WBC) (OHS): 0 /100 WBC
PLATELET # BLD AUTO: 263 K/UL (ref 150–450)
PLATELET # BLD AUTO: 271 K/UL (ref 150–450)
PMV BLD AUTO: 10 FL (ref 9.2–12.9)
PMV BLD AUTO: 10.2 FL (ref 9.2–12.9)
POTASSIUM SERPL-SCNC: 4.9 MMOL/L (ref 3.5–5.1)
PROT SERPL-MCNC: 7.7 GM/DL (ref 6–8.4)
PROTHROMBIN TIME: 11.2 SECONDS (ref 9–12.5)
PROTHROMBIN TIME: 11.2 SECONDS (ref 9–12.5)
RBC # BLD AUTO: 4.18 M/UL (ref 4.6–6.2)
RBC # BLD AUTO: 4.23 M/UL (ref 4.6–6.2)
RELATIVE EOSINOPHIL (OHS): 4 %
RELATIVE EOSINOPHIL (OHS): 4.5 %
RELATIVE LYMPHOCYTE (OHS): 15.6 % (ref 18–48)
RELATIVE LYMPHOCYTE (OHS): 15.9 % (ref 18–48)
RELATIVE MONOCYTE (OHS): 6.3 % (ref 4–15)
RELATIVE MONOCYTE (OHS): 7 % (ref 4–15)
RELATIVE NEUTROPHIL (OHS): 71.2 % (ref 38–73)
RELATIVE NEUTROPHIL (OHS): 72.1 % (ref 38–73)
SODIUM SERPL-SCNC: 137 MMOL/L (ref 136–145)
WBC # BLD AUTO: 6.62 K/UL (ref 3.9–12.7)
WBC # BLD AUTO: 6.68 K/UL (ref 3.9–12.7)

## 2025-06-25 PROCEDURE — 99214 OFFICE O/P EST MOD 30 MIN: CPT | Mod: S$GLB,,, | Performed by: INTERNAL MEDICINE

## 2025-06-25 PROCEDURE — 1100F PTFALLS ASSESS-DOCD GE2>/YR: CPT | Mod: CPTII,S$GLB,, | Performed by: INTERNAL MEDICINE

## 2025-06-25 PROCEDURE — 3288F FALL RISK ASSESSMENT DOCD: CPT | Mod: CPTII,S$GLB,, | Performed by: INTERNAL MEDICINE

## 2025-06-25 PROCEDURE — 82565 ASSAY OF CREATININE: CPT

## 2025-06-25 PROCEDURE — 1160F RVW MEDS BY RX/DR IN RCRD: CPT | Mod: CPTII,S$GLB,, | Performed by: INTERNAL MEDICINE

## 2025-06-25 PROCEDURE — 99999 PR PBB SHADOW E&M-EST. PATIENT-LVL IV: CPT | Mod: PBBFAC,,, | Performed by: INTERNAL MEDICINE

## 2025-06-25 PROCEDURE — 1159F MED LIST DOCD IN RCRD: CPT | Mod: CPTII,S$GLB,, | Performed by: INTERNAL MEDICINE

## 2025-06-25 PROCEDURE — 82040 ASSAY OF SERUM ALBUMIN: CPT

## 2025-06-25 PROCEDURE — 1125F AMNT PAIN NOTED PAIN PRSNT: CPT | Mod: CPTII,S$GLB,, | Performed by: INTERNAL MEDICINE

## 2025-06-25 PROCEDURE — 85610 PROTHROMBIN TIME: CPT | Mod: 91

## 2025-06-25 PROCEDURE — 3077F SYST BP >= 140 MM HG: CPT | Mod: CPTII,S$GLB,, | Performed by: INTERNAL MEDICINE

## 2025-06-25 PROCEDURE — 3078F DIAST BP <80 MM HG: CPT | Mod: CPTII,S$GLB,, | Performed by: INTERNAL MEDICINE

## 2025-06-25 PROCEDURE — 80321 ALCOHOLS BIOMARKERS 1OR 2: CPT

## 2025-06-25 PROCEDURE — 36415 COLL VENOUS BLD VENIPUNCTURE: CPT

## 2025-06-25 PROCEDURE — 85025 COMPLETE CBC W/AUTO DIFF WBC: CPT | Mod: 91

## 2025-06-25 PROCEDURE — 3008F BODY MASS INDEX DOCD: CPT | Mod: CPTII,S$GLB,, | Performed by: INTERNAL MEDICINE

## 2025-06-25 RX ORDER — SULFAMETHOXAZOLE AND TRIMETHOPRIM 800; 160 MG/1; MG/1
1 TABLET ORAL 2 TIMES DAILY
COMMUNITY
Start: 2025-06-20

## 2025-06-25 NOTE — PROGRESS NOTES
Hepatology Follow Up Note    Referring provider: No ref. provider found  PCP: Hina Temple MD    Chief complaint: follow up decompensated EtOH cirrhosis     Last seen in clinic on: 3/19/25    Brief HPI:  Dexter Robertson is a 69 y.o. male with decompensated EtOH cirrhosis complicated by prior  who presents for scheduled follow up.    Interval Events:  - Accompanied by Cherelle.  - Feels well. Denies acute complaints.  - Went to ED in June 2025 after suffering a mechanical fall with head strike. Had scalp laceration repaired. States he had taken gabapentin, which resulted in confusion.  - Quit drinking liquor ~March 2025. Now drinking ~3 12oz [place] Light beers a day.  - The patient denies abdominal distension, encephalopathy, jaundice, gross GI bleeding.  - This is the extent of the patient's complaints at this time.    Past Medical History:   Diagnosis Date    Alcoholic intoxication     Arthritis     hand    Asthma     Bleeding ulcer     Cirrhosis     Depression     Diverticulitis     Fistula     GERD without esophagitis     H/O seasonal allergies     High cholesterol     Hyperlipidemia     Irregular heart beat     JOYCE (obstructive sleep apnea)     PVC (premature ventricular contraction)     Smokes cigarettes        Past Surgical History:   Procedure Laterality Date    carpel tunnel surgery      COLONOSCOPY N/A 07/20/2020    Procedure: COLONOSCOPY;  Surgeon: Zack West MD;  Location: [facility];  Service: General;  Laterality: N/A;    ESOPHAGOGASTRODUODENOSCOPY N/A 4/14/2025    Procedure: EGD (ESOPHAGOGASTRODUODENOSCOPY);  Surgeon: Sage Sewell MD;  Location: [address]);  Service: Endoscopy;  Laterality: N/A;  cirrohsis- labs prior  3/20 ref by SAGE SEWELL MD, mailed-st  4/4 labs completed 3/31. precall complete. kw    HERNIA REPAIR      LYSIS OF ADHESIONS N/A 08/19/2020    Procedure: LYSIS, ADHESIONS;  Surgeon: Zack West MD;  Location: [facility] OR;  Service: General;  Laterality:  "N/A;    SHOULDER SURGERY Right     SUBTOTAL COLECTOMY N/A 08/19/2020    Procedure: COLECTOMY, PARTIAL,  for colovesical fistula;  Surgeon: Zack West MD;  Location: [facility] OR;  Service: General;  Laterality: N/A;  covid negative 8-11      TONSILLECTOMY         Family History   Problem Relation Name Age of Onset    Diabetes Mother      Heart disease Mother      Heart disease Father         Social History[1]    Current Medications[2]    Review of patient's allergies indicates:   Allergen Reactions    Bee sting [allergen ext-venom-honey bee] Anaphylaxis    Ativan [lorazepam] Other (See Comments)     confusion    Iodine and iodide containing products Edema    Pcn [penicillins] Edema    Iodine Other (See Comments)    Shellfish containing products             Vitals:    06/25/25 1052   BP: (!) 183/77   Pulse: 71   Resp: 18   SpO2: 98%   Weight: 79.4 kg (175 lb 0.7 oz)   Height: 5' 10" (1.778 m)   Body mass index is 25.12 kg/m².    Physical Exam  Constitutional:       General: He is not in acute distress.  Eyes:      General: No scleral icterus.  Cardiovascular:      Rate and Rhythm: Normal rate.   Pulmonary:      Effort: Pulmonary effort is normal.   Abdominal:      General: Abdomen is flat. There is no distension.      Palpations: Abdomen is soft. There is no fluid wave, hepatomegaly or splenomegaly.      Tenderness: There is no abdominal tenderness.   Musculoskeletal:      Right lower leg: No edema.      Left lower leg: No edema.   Skin:     General: Skin is warm.      Coloration: Skin is not jaundiced.      Comments:     Neurological:      General: No focal deficit present.      Mental Status: He is alert and oriented to person, place, and time.      Comments: No asterixis.   Psychiatric:         Behavior: Behavior is cooperative.         Thought Content: Thought content normal.         LABS: I personally reviewed pertinent laboratory findings.    Lab Results   Component Value Date    WBC 7.20 03/31/2025    HGB 16.1 " 03/31/2025    HCT 46.4 03/31/2025     (H) 03/31/2025     03/31/2025       Lab Results   Component Value Date     (L) 03/31/2025    K 4.4 03/31/2025    CL 98 03/31/2025    CO2 27 03/31/2025    BUN 7 (L) 03/31/2025    CREATININE 0.83 03/31/2025    CALCIUM 9.1 03/31/2025    ANIONGAP 10 03/31/2025    ESTGFRAFRICA >60 08/22/2020    EGFRNONAA >60 08/22/2020       Lab Results   Component Value Date    ALT 44 03/31/2025     (H) 03/31/2025     (H) 06/10/2016    ALKPHOS 280 (H) 03/31/2025    BILITOT 1.7 (H) 03/31/2025       Lab Results   Component Value Date    HAV Non-reactive 08/15/2024    HEPAIGM Negative 06/09/2016    HEPBIGM Negative 06/09/2016    HEPBCAB Non-reactive 08/15/2024    HEPCAB Non-reactive 08/15/2024       Lab Results   Component Value Date    SMOOTHMUSCAB Positive 1:40 (A) 06/09/2016    CERULOPLSM 31.0 06/09/2016        MELD 3.0: 10 at 3/31/2025  8:49 AM  MELD-Na: 8 at 3/31/2025  8:49 AM  Calculated from:  Serum Creatinine: 0.83 mg/dL (Using min of 1 mg/dL) at 3/31/2025  8:49 AM  Serum Sodium: 135 mmol/L at 3/31/2025  8:49 AM  Total Bilirubin: 1.7 mg/dL at 3/31/2025  8:49 AM  Serum Albumin: 4.3 g/dL (Using max of 3.5 g/dL) at 3/31/2025  8:49 AM  INR(ratio): 1.0 at 3/31/2025  8:49 AM  Age at listing (hypothetical): 69 years  Sex: Male at 3/31/2025  8:49 AM       IMAGING: I personally reviewed imaging studies.      Assessment:  Dexter Robertson is a 69 y.o. male with decompensated EtOH cirrhosis complicated by prior  who presents for scheduled follow up.    1. Decompensated cirrhosis    2. Alcoholic cirrhosis of liver with ascites    3. History of abdominal paracentesis    4. Alcohol use disorder      #Decompensated EtOH Cirrhosis: prior ascites requiring paracentesis back in 2016, but recompensated likely due to period of abstinence around that time.      Volume: Prior ascites and paracentesis. Last paracentesis 2016. No ascites, no LE edema on my exam. Remains off  diuretics.  Infection: No concern at this time.   Bleeding: EGD without EV 4/14/25.    Encephalopathy: No prior history.  Screening: AFP 4.6 3/31/25. Last liver US w/o HCC 3/18/25. AFP and US abd q6 months.  Immunization: Recommend HAV, HBV vaccination.  Transplant: Recompensated disease. Has continued to drink despite knowledge of liver disease.      #Alcohol Use Disorder: Continues to drink alcohol. Reports he quit drinking liquor. Now drinking ~3 beers a day. I have counseled him on the importance of complete abstinence to prevent progression of liver disease.     Return to clinic in 8 months.    Kristy Sewell MD  Staff Physician  Hepatology and Liver Transplant  [facility]  [facility]           [1]   Social History  Tobacco Use    Smoking status: Every Day     Current packs/day: 0.50     Average packs/day: 0.5 packs/day for 44.5 years (22.2 ttl pk-yrs)     Types: Cigarettes     Start date: 4/11/1977     Last attempt to quit: 4/11/2020     Passive exposure: Current    Smokeless tobacco: Never   Substance Use Topics    Alcohol use: Yes     Comment: social    Drug use: Yes     Types: Marijuana   [2]   Current Outpatient Medications   Medication Sig Dispense Refill    aspirin 81 MG Chew Take 81 mg by mouth once daily.      citalopram (CELEXA) 20 MG tablet Take 1 tablet (20 mg total) by mouth once daily. 90 tablet 3    cyanocobalamin (VITAMIN B-12) 1000 MCG tablet Take 1,000 mcg by mouth once daily.      folic acid (FOLVITE) 400 MCG tablet Take 400 mcg by mouth once daily.      gabapentin (NEURONTIN) 300 MG capsule Take 1 capsule (300 mg total) by mouth 3 (three) times daily. for 10 days 30 capsule 0    meloxicam (MOBIC) 7.5 MG tablet Take 7.5 mg by mouth once daily.      mirtazapine (REMERON SOL-TAB) 15 MG disintegrating tablet Take 15 mg by mouth every evening.      pantoprazole (PROTONIX) 40 MG tablet TAKE 1 TABLET BY MOUTH EVERY DAY 30 tablet 0     sulfamethoxazole-trimethoprim 800-160mg (BACTRIM DS) 800-160 mg Tab Take 1 tablet by mouth 2 (two) times daily.      varenicline tartrate (CHANTIX) 1 mg Tab Take 1 tablet (1 mg total) by mouth 2 (two) times daily. 60 tablet 2     No current facility-administered medications for this visit.

## 2025-06-26 ENCOUNTER — RESULTS FOLLOW-UP (OUTPATIENT)
Dept: TRANSPLANT | Facility: CLINIC | Age: 70
End: 2025-06-26

## 2025-06-28 LAB
PLPETH BLD-MCNC: 334 NG/ML
POPETH BLD-MCNC: 561 NG/ML
TOXICOLOGIST REVIEW: NORMAL

## (undated) DEVICE — DRAIN PENROSE XRAY 12 X 1/4 ST

## (undated) DEVICE — TRAY MINOR GEN SURG

## (undated) DEVICE — SEE MEDLINE ITEM 157117

## (undated) DEVICE — DRESSING TRANS 4X4 TEGADERM

## (undated) DEVICE — SUT VICRYL 3-0 27 SH

## (undated) DEVICE — SEE MEDLINE ITEM 157148

## (undated) DEVICE — SUT 2-0 VICRYL / CT-1

## (undated) DEVICE — DRESSING TELFA STRL 4X3 LF

## (undated) DEVICE — SEE MEDLINE ITEM 146417

## (undated) DEVICE — SUT VICRYL PLUS 3-0 SH 18IN

## (undated) DEVICE — APPLICATOR CHLORAPREP ORN 26ML

## (undated) DEVICE — SUT MCRYL PLUS 4-0 PS2 27IN

## (undated) DEVICE — NDL HYPO REG 25G X 1 1/2

## (undated) DEVICE — GOWN SURGICAL X-LARGE

## (undated) DEVICE — ELECTRODE REM PLYHSV RETURN 9